# Patient Record
Sex: MALE | NOT HISPANIC OR LATINO
[De-identification: names, ages, dates, MRNs, and addresses within clinical notes are randomized per-mention and may not be internally consistent; named-entity substitution may affect disease eponyms.]

---

## 2021-05-05 ENCOUNTER — APPOINTMENT (OUTPATIENT)
Dept: UROLOGY | Facility: CLINIC | Age: 41
End: 2021-05-05
Payer: MEDICAID

## 2021-05-05 VITALS
WEIGHT: 154.32 LBS | BODY MASS INDEX: 24.22 KG/M2 | DIASTOLIC BLOOD PRESSURE: 90 MMHG | HEIGHT: 67 IN | TEMPERATURE: 98.5 F | HEART RATE: 85 BPM | SYSTOLIC BLOOD PRESSURE: 131 MMHG

## 2021-05-05 PROBLEM — Z00.00 ENCOUNTER FOR PREVENTIVE HEALTH EXAMINATION: Status: ACTIVE | Noted: 2021-05-05

## 2021-05-05 PROCEDURE — 99072 ADDL SUPL MATRL&STAF TM PHE: CPT

## 2021-05-05 PROCEDURE — 99204 OFFICE O/P NEW MOD 45 MIN: CPT

## 2021-05-05 RX ORDER — AMLODIPINE BESYLATE AND BENAZEPRIL HYDROCHLORIDE 10; 40 MG/1; MG/1
CAPSULE ORAL
Refills: 0 | Status: ACTIVE | COMMUNITY

## 2021-05-05 NOTE — ASSESSMENT
[FreeTextEntry1] : Very pleasant 40 yom who presents for evaluation of premature ejaculation.  \par \par - Discussed treatment options including numbing medications, SSRI's, and PDE-5 inhibitors.  \par - Will start paroxetine 10mg/day\par -Discussed risk of paroxetine\par - f/u in 1 month\par 
[FreeTextEntry1] : Very pleasant 40 yom who presents for evaluation of premature ejaculation.  \par \par - Discussed treatment options including numbing medications, SSRI's, and PDE-5 inhibitors.  \par - Will start paroxetine 10mg/day\par -Discussed risk of paroxetine\par - f/u in 1 month\par 
EOMI; PERRL; no drainage or redness

## 2021-05-05 NOTE — HISTORY OF PRESENT ILLNESS
[FreeTextEntry1] : Pleasant 40 yom with no PMH who presents for evaluation of premature ejaculation.  For the past year to year and a half he reports he frequently ejaculates quickly, typically within 1 to 2 minutes.  He said this intermittently occurred in the past, but has become more frequent than not.  Reports no issues with erections.  No issues with urination. Has tried lidocaine spray without success.  This is very bothersome to him.

## 2021-06-09 ENCOUNTER — APPOINTMENT (OUTPATIENT)
Dept: UROLOGY | Facility: CLINIC | Age: 41
End: 2021-06-09
Payer: MEDICAID

## 2021-06-09 PROCEDURE — 99214 OFFICE O/P EST MOD 30 MIN: CPT

## 2021-06-09 NOTE — PHYSICAL EXAM
[General Appearance - Well Developed] : well developed [General Appearance - Well Nourished] : well nourished [Normal Appearance] : normal appearance [Well Groomed] : well groomed [General Appearance - In No Acute Distress] : no acute distress [Abdomen Soft] : soft [Abdomen Tenderness] : non-tender [Costovertebral Angle Tenderness] : no ~M costovertebral angle tenderness [Urinary Bladder Findings] : the bladder was normal on palpation [Urethral Meatus] : meatus normal [Scrotum] : the scrotum was normal [Testes Mass (___cm)] : there were no testicular masses [Edema] : no peripheral edema [] : no respiratory distress [Respiration, Rhythm And Depth] : normal respiratory rhythm and effort [Exaggerated Use Of Accessory Muscles For Inspiration] : no accessory muscle use [Oriented To Time, Place, And Person] : oriented to person, place, and time [Affect] : the affect was normal [Mood] : the mood was normal [Not Anxious] : not anxious [Normal Station and Gait] : the gait and station were normal for the patient's age [No Focal Deficits] : no focal deficits [No Palpable Adenopathy] : no palpable adenopathy

## 2021-06-09 NOTE — ASSESSMENT
[FreeTextEntry1] : Very pleasant 40-year-old gentleman who presents for follow-up of premature ejaculation, new complaint of erectile dysfunction\par -Continue paroxetine\par -We had an extensive discussion regarding possible management options for erectile dysfunction, including PDE 5 inhibitors, ESSENCE, ICI, intraurethral alprostadil, penile prosthesis\par -After a thorough discussion of the risks and benefits of the aforementioned options he would like to try a trial of a PDE 5 inhibitor\par -Trial of Cialis\par -I discussed the risks, benefits, alternatives, and possible side effects of Cialis (tadalafil) therapy with the patient, including but not limited to headache, flushing, upset stomach, blurry vision, change in color vision, vision loss, and priapism with the patient.\par -Follow-up in 1 month

## 2021-06-09 NOTE — HISTORY OF PRESENT ILLNESS
[FreeTextEntry1] : Very pleasant 40 yom with no PMH who presents for follow-up of premature ejaculation.  For the past year to year and a half he reports he frequently ejaculates quickly, typically within 1 to 2 minutes.  No issues with urination. Has tried lidocaine spray without success.  This is very bothersome to him.\par \par He reports that paroxetine has significantly improved his intravaginal latency time.  He does report new onset of erectile dysfunction.  Previously he reports that his erections were relatively weak, however over the last month he reports 2 instances in which he was unable to obtain an erection at all.  He is interested in treatment for this.

## 2021-06-29 ENCOUNTER — RX CHANGE (OUTPATIENT)
Age: 41
End: 2021-06-29

## 2021-07-09 ENCOUNTER — APPOINTMENT (OUTPATIENT)
Dept: UROLOGY | Facility: CLINIC | Age: 41
End: 2021-07-09
Payer: MEDICAID

## 2021-07-09 PROCEDURE — 99214 OFFICE O/P EST MOD 30 MIN: CPT

## 2021-07-09 NOTE — HISTORY OF PRESENT ILLNESS
[FreeTextEntry1] : Very pleasant 40 yom with no PMH who presents for follow-up of premature ejaculation. He was started on paroxetine at last visit and reports that this has significantly improved his intravaginal latency time.  He also experiences ED and takes Cialis 5mg PRN prior to sexual activity. \par \par He states that his premature ejaculation has improved. His latency time is now anywhere from 6-8 minutes at times lasting up to 20 minutes. States that this is markedly improved from what he was experiencing prior to initiating medicine. Also states that he takes Cialis anywhere from 1-3x per week. Usually works for him but at times feels that the Cialis doesn't help and is interested in going up on his dose. No other complaints. Mood has been stable since starting SSRI.

## 2021-07-09 NOTE — ASSESSMENT
[FreeTextEntry1] : Very pleasant 40yoM presenting for f/u regarding premature ejaculation and ED. PE has improved significantly on paroxetine. Is taking Cialis but would like to go up on dose for ED\par --C/w paroxetine for premature ejaculation\par --Increase Cialis dose to 10mg PRN for sexual activity. Counseled patient on proper way and time to take medicine (30-60min prior to sexual activity, still needs sexual stimulation with medicine). \par -I discussed the risks, benefits, alternatives, and possible side effects of Cialis (tadalafil) therapy with the patient, including but not limited to headache, flushing, upset stomach, blurry vision, change in color vision, vision loss, and priapism with the patient.\par --F/u in 3 months

## 2021-07-13 ENCOUNTER — TRANSCRIPTION ENCOUNTER (OUTPATIENT)
Age: 41
End: 2021-07-13

## 2021-10-12 ENCOUNTER — APPOINTMENT (OUTPATIENT)
Dept: UROLOGY | Facility: CLINIC | Age: 41
End: 2021-10-12
Payer: MEDICAID

## 2021-10-12 PROCEDURE — 99214 OFFICE O/P EST MOD 30 MIN: CPT | Mod: 95

## 2021-10-12 RX ORDER — TADALAFIL 10 MG/1
10 TABLET, FILM COATED ORAL
Qty: 30 | Refills: 1 | Status: DISCONTINUED | COMMUNITY
Start: 2021-06-09 | End: 2021-10-12

## 2021-10-12 NOTE — ASSESSMENT
[FreeTextEntry1] : Very pleasant 41-year-old gentleman who presents for follow-up of erectile dysfunction and premature ejaculation\par -Stop Cialis\par -Trial of sildenafil 50 mg prior to sexual intercourse\par -I discussed the risks, benefits, alternatives, and possible side effects of Viagra (sildenafil) therapy with the patient, including but not limited to headache, flushing, upset stomach, blurry vision, change in color vision, vision loss, and priapism with the patient.\par -Continue paroxetine–refill sent to the pharmacy\par -Follow-up in 3 months

## 2021-10-12 NOTE — HISTORY OF PRESENT ILLNESS
[FreeTextEntry1] : Very pleasant 41 yom who presents for follow-up of premature ejaculation and erectile dysfunction. He was started on paroxetine previously and reports that this has significantly improved his intravaginal latency time.  He also reports that Cialis 10 mg has significantly improved his erections.  He does report GI upset, however and he is concerned that this may be due to Cialis.

## 2021-10-12 NOTE — PHYSICAL EXAM
[General Appearance - Well Developed] : well developed [General Appearance - Well Nourished] : well nourished [Normal Appearance] : normal appearance [Well Groomed] : well groomed [General Appearance - In No Acute Distress] : no acute distress [Abdomen Soft] : soft [Abdomen Tenderness] : non-tender [Costovertebral Angle Tenderness] : no ~M costovertebral angle tenderness [Urethral Meatus] : meatus normal [Urinary Bladder Findings] : the bladder was normal on palpation [Scrotum] : the scrotum was normal [Testes Mass (___cm)] : there were no testicular masses [] : no respiratory distress [Edema] : no peripheral edema [Exaggerated Use Of Accessory Muscles For Inspiration] : no accessory muscle use [Respiration, Rhythm And Depth] : normal respiratory rhythm and effort [Oriented To Time, Place, And Person] : oriented to person, place, and time [Affect] : the affect was normal [Mood] : the mood was normal [Not Anxious] : not anxious [Normal Station and Gait] : the gait and station were normal for the patient's age [No Focal Deficits] : no focal deficits [No Palpable Adenopathy] : no palpable adenopathy

## 2021-11-04 ENCOUNTER — RX RENEWAL (OUTPATIENT)
Age: 41
End: 2021-11-04

## 2021-11-08 ENCOUNTER — RX RENEWAL (OUTPATIENT)
Age: 41
End: 2021-11-08

## 2022-01-12 ENCOUNTER — LABORATORY RESULT (OUTPATIENT)
Age: 42
End: 2022-01-12

## 2022-01-12 ENCOUNTER — APPOINTMENT (OUTPATIENT)
Dept: UROLOGY | Facility: CLINIC | Age: 42
End: 2022-01-12
Payer: MEDICAID

## 2022-01-12 PROCEDURE — 99214 OFFICE O/P EST MOD 30 MIN: CPT

## 2022-01-12 RX ORDER — CLOTRIMAZOLE AND BETAMETHASONE DIPROPIONATE 10; .5 MG/G; MG/G
1-0.05 CREAM TOPICAL 3 TIMES DAILY
Qty: 1 | Refills: 0 | Status: ACTIVE | COMMUNITY
Start: 2022-01-12 | End: 1900-01-01

## 2022-01-12 NOTE — HISTORY OF PRESENT ILLNESS
[Currently Experiencing ___] :  [unfilled] [Erectile Dysfunction] : Erectile Dysfunction [FreeTextEntry1] : Mr. Cantu is a very pleasant 41 year old man here today with history of ED and premature ejaculation.  He also reports irritation under his foreskin recently which spontaneously resolved.  His wife, who accompanies him to the visit today, reports that she was diagnosed with type II genital herpes and they believe that this is the etiology of the lesions on his foreskin.\par He reports that since his last visit, potency of Viagra has slowly worn off.  He also reports that paroxetine 10 mg no longer significantly improves his intravaginal latency time.  He is interested in trying a stronger dose of the medication.\par He reports when he first started taking it it worked very well, now it occasionally works and occasionally doesn't work.\par Here today to go over options for treatment.\par

## 2022-01-12 NOTE — PHYSICAL EXAM
[General Appearance - Well Developed] : well developed [General Appearance - Well Nourished] : well nourished [Normal Appearance] : normal appearance [Well Groomed] : well groomed [General Appearance - In No Acute Distress] : no acute distress [Abdomen Soft] : soft [Abdomen Tenderness] : non-tender [Costovertebral Angle Tenderness] : no ~M costovertebral angle tenderness [Edema] : no peripheral edema [] : no respiratory distress [Respiration, Rhythm And Depth] : normal respiratory rhythm and effort [Exaggerated Use Of Accessory Muscles For Inspiration] : no accessory muscle use [Oriented To Time, Place, And Person] : oriented to person, place, and time [Affect] : the affect was normal [Mood] : the mood was normal [Not Anxious] : not anxious [Normal Station and Gait] : the gait and station were normal for the patient's age [No Focal Deficits] : no focal deficits [No Palpable Adenopathy] : no palpable adenopathy [FreeTextEntry1] : No evidence of balanitis or genital lesions at this time

## 2022-01-12 NOTE — ASSESSMENT
[FreeTextEntry1] : Mr. Cantu is a very pleasant 41 year old man here today with history of ED and premature ejaculation, new complaint of genital lesions concerning for type II herpes\par Continue Viagra 50 mg\par Increase dose of paroxetine to 20 mg.  We discussed the risk and benefits of this\par Send in clotrimazole betamethasone cream given concern for balanitis\par RPR, HIV and herpes testing today.\par Follow up in 3 months.\par

## 2022-01-13 LAB — HIV1+2 AB SPEC QL IA.RAPID: NONREACTIVE

## 2022-01-14 LAB
C PNEUM IGG SER QL: NORMAL TITER
C PNEUM IGM SER QL: NORMAL TITER
C PSITTACI IGG SER QL: NORMAL TITER
C PSITTACI IGM SER QL: NORMAL TITER
C TRACH B IGG SER QL: NORMAL TITER
C TRACH B IGM SER QL: NORMAL TITER
HSV 1+2 IGG SER IA-IMP: POSITIVE
HSV 1+2 IGG SER IA-IMP: POSITIVE
HSV1 IGG SER QL: 55.5 INDEX
HSV2 IGG SER QL: 17.5 INDEX

## 2022-01-18 LAB
HSV1 IGM SER QL: NEGATIVE
HSV2 AB FLD-ACNC: NEGATIVE
RPR SER-TITR: NORMAL
T PALLIDUM AB SER QL IA: POSITIVE

## 2022-01-18 RX ORDER — PENICILLIN G BENZATHINE 2400000 [IU]/4ML
2400000 INJECTION, SUSPENSION INTRAMUSCULAR
Qty: 1 | Refills: 0 | Status: ACTIVE | COMMUNITY
Start: 2022-01-18 | End: 1900-01-01

## 2022-01-19 ENCOUNTER — APPOINTMENT (OUTPATIENT)
Dept: UROLOGY | Facility: CLINIC | Age: 42
End: 2022-01-19

## 2022-01-19 ENCOUNTER — NON-APPOINTMENT (OUTPATIENT)
Age: 42
End: 2022-01-19

## 2022-01-20 ENCOUNTER — MED ADMIN CHARGE (OUTPATIENT)
Age: 42
End: 2022-01-20

## 2022-01-20 ENCOUNTER — APPOINTMENT (OUTPATIENT)
Dept: UROLOGY | Facility: CLINIC | Age: 42
End: 2022-01-20
Payer: MEDICAID

## 2022-01-20 VITALS — TEMPERATURE: 97 F

## 2022-01-20 PROCEDURE — 96372 THER/PROPH/DIAG INJ SC/IM: CPT

## 2022-01-20 RX ORDER — PENICILLIN G BENZATHINE 1200000 [IU]/2ML
1200000 INJECTION, SUSPENSION INTRAMUSCULAR
Qty: 1 | Refills: 0 | Status: COMPLETED | OUTPATIENT
Start: 2022-01-20

## 2022-01-20 RX ADMIN — PENICILLIN G BENZATHINE 0 UNIT/2ML: 1200000 INJECTION, SUSPENSION INTRAMUSCULAR at 00:00

## 2022-01-20 RX ADMIN — PENICILLIN G BENZATHINE AND PENICILLIN G PROCAINE 0 UNIT/2ML: 600000; 600000 INJECTION, SUSPENSION INTRAMUSCULAR at 00:00

## 2022-02-03 ENCOUNTER — APPOINTMENT (OUTPATIENT)
Dept: UROLOGY | Facility: CLINIC | Age: 42
End: 2022-02-03

## 2022-02-04 ENCOUNTER — LABORATORY RESULT (OUTPATIENT)
Age: 42
End: 2022-02-04

## 2022-02-04 ENCOUNTER — APPOINTMENT (OUTPATIENT)
Dept: UROLOGY | Facility: CLINIC | Age: 42
End: 2022-02-04
Payer: MEDICAID

## 2022-02-04 PROCEDURE — 99213 OFFICE O/P EST LOW 20 MIN: CPT

## 2022-02-04 RX ORDER — GUAIFENESIN 200 MG/10ML
200 SOLUTION ORAL
Qty: 300 | Refills: 0 | Status: ACTIVE | COMMUNITY
Start: 2021-12-12

## 2022-02-04 RX ORDER — AMLODIPINE BESYLATE 10 MG/1
10 TABLET ORAL
Qty: 90 | Refills: 0 | Status: ACTIVE | COMMUNITY
Start: 2021-12-26

## 2022-02-04 RX ORDER — CLOTRIMAZOLE 10 MG/ML
1 SOLUTION TOPICAL
Qty: 30 | Refills: 0 | Status: ACTIVE | COMMUNITY
Start: 2021-12-15

## 2022-02-04 RX ORDER — AMOXICILLIN 500 MG/1
500 CAPSULE ORAL
Qty: 21 | Refills: 0 | Status: ACTIVE | COMMUNITY
Start: 2021-11-26

## 2022-02-04 RX ORDER — IBUPROFEN 600 MG/1
600 TABLET, FILM COATED ORAL
Qty: 20 | Refills: 0 | Status: ACTIVE | COMMUNITY
Start: 2021-11-26

## 2022-02-04 RX ORDER — ACETAMINOPHEN AND CODEINE 300; 30 MG/1; MG/1
300-30 TABLET ORAL
Qty: 16 | Refills: 0 | Status: ACTIVE | COMMUNITY
Start: 2021-11-26

## 2022-02-04 NOTE — ASSESSMENT
[FreeTextEntry1] : Very pleasant 41-year-old gentleman who presents for follow-up after recent positive syphilis screen, balanitis, penile lesion\par -HIV negative\par -Herpes type I and II IgG positive\par -Herpes type I and II IgM negative\par -Repeat syphilis screen today\par -We discussed potential etiologies of a positive syphilis screen at length\par -We discussed safe sex practices; patient reports that he is only sexually active with his wife, however was sexually active with multiple female partners in the past prior to his relationship with his wife\par -Follow-up in 6 months if syphilis screening returns negative

## 2022-02-04 NOTE — HISTORY OF PRESENT ILLNESS
[FreeTextEntry1] : Very pleasant 41-year-old gentleman who presents for follow-up of positive syphilis test.  He underwent IM syphilis injection in the office approximately 2 weeks ago.  He reports no additional lesions on his penis.  He presents today for follow-up and to undergo syphilis screening again.

## 2022-02-07 ENCOUNTER — NON-APPOINTMENT (OUTPATIENT)
Age: 42
End: 2022-02-07

## 2022-02-07 LAB — T PALLIDUM AB SER QL IA: POSITIVE

## 2022-02-09 ENCOUNTER — LABORATORY RESULT (OUTPATIENT)
Age: 42
End: 2022-02-09

## 2022-02-09 ENCOUNTER — OUTPATIENT (OUTPATIENT)
Dept: OUTPATIENT SERVICES | Facility: HOSPITAL | Age: 42
LOS: 1 days | End: 2022-02-09
Payer: MEDICAID

## 2022-02-09 ENCOUNTER — RX RENEWAL (OUTPATIENT)
Age: 42
End: 2022-02-09

## 2022-02-09 ENCOUNTER — APPOINTMENT (OUTPATIENT)
Dept: INFECTIOUS DISEASE | Facility: CLINIC | Age: 42
End: 2022-02-09
Payer: MEDICAID

## 2022-02-09 VITALS
DIASTOLIC BLOOD PRESSURE: 92 MMHG | TEMPERATURE: 98.7 F | HEART RATE: 96 BPM | SYSTOLIC BLOOD PRESSURE: 142 MMHG | BODY MASS INDEX: 24.8 KG/M2 | OXYGEN SATURATION: 98 % | WEIGHT: 158 LBS | HEIGHT: 67 IN

## 2022-02-09 DIAGNOSIS — A53.9 SYPHILIS, UNSPECIFIED: ICD-10-CM

## 2022-02-09 DIAGNOSIS — F17.200 NICOTINE DEPENDENCE, UNSPECIFIED, UNCOMPLICATED: ICD-10-CM

## 2022-02-09 DIAGNOSIS — Z86.79 PERSONAL HISTORY OF OTHER DISEASES OF THE CIRCULATORY SYSTEM: ICD-10-CM

## 2022-02-09 DIAGNOSIS — B97.89 OTHER VIRAL AGENTS AS THE CAUSE OF DISEASES CLASSIFIED ELSEWHERE: ICD-10-CM

## 2022-02-09 DIAGNOSIS — Z80.9 FAMILY HISTORY OF MALIGNANT NEOPLASM, UNSPECIFIED: ICD-10-CM

## 2022-02-09 LAB
HBV CORE AB SER-ACNC: SIGNIFICANT CHANGE UP
HBV SURFACE AB SER-ACNC: ABNORMAL
HBV SURFACE AG SER-ACNC: SIGNIFICANT CHANGE UP
HCT VFR BLD CALC: 48.8 % — SIGNIFICANT CHANGE UP (ref 39–50)
HCV AB S/CO SERPL IA: 0.11 S/CO — SIGNIFICANT CHANGE UP (ref 0–0.99)
HCV AB SERPL-IMP: SIGNIFICANT CHANGE UP
HGB BLD-MCNC: 16 G/DL — SIGNIFICANT CHANGE UP (ref 13–17)
MCHC RBC-ENTMCNC: 28.4 PG — SIGNIFICANT CHANGE UP (ref 27–34)
MCHC RBC-ENTMCNC: 32.8 GM/DL — SIGNIFICANT CHANGE UP (ref 32–36)
MCV RBC AUTO: 86.5 FL — SIGNIFICANT CHANGE UP (ref 80–100)
PLATELET # BLD AUTO: 249 K/UL — SIGNIFICANT CHANGE UP (ref 150–400)
RBC # BLD: 5.64 M/UL — SIGNIFICANT CHANGE UP (ref 4.2–5.8)
RBC # FLD: 12.8 % — SIGNIFICANT CHANGE UP (ref 10.3–14.5)
WBC # BLD: 7.96 K/UL — SIGNIFICANT CHANGE UP (ref 3.8–10.5)
WBC # FLD AUTO: 7.96 K/UL — SIGNIFICANT CHANGE UP (ref 3.8–10.5)

## 2022-02-09 PROCEDURE — 80053 COMPREHEN METABOLIC PANEL: CPT

## 2022-02-09 PROCEDURE — 99203 OFFICE O/P NEW LOW 30 MIN: CPT | Mod: GC

## 2022-02-09 PROCEDURE — 96372 THER/PROPH/DIAG INJ SC/IM: CPT

## 2022-02-09 PROCEDURE — 87591 N.GONORRHOEAE DNA AMP PROB: CPT

## 2022-02-09 PROCEDURE — 86780 TREPONEMA PALLIDUM: CPT

## 2022-02-09 PROCEDURE — G0463: CPT | Mod: 25

## 2022-02-09 PROCEDURE — 86706 HEP B SURFACE ANTIBODY: CPT

## 2022-02-09 PROCEDURE — 85027 COMPLETE CBC AUTOMATED: CPT

## 2022-02-09 PROCEDURE — 87340 HEPATITIS B SURFACE AG IA: CPT

## 2022-02-09 PROCEDURE — 86803 HEPATITIS C AB TEST: CPT

## 2022-02-09 PROCEDURE — 86593 SYPHILIS TEST NON-TREP QUANT: CPT

## 2022-02-09 PROCEDURE — 86704 HEP B CORE ANTIBODY TOTAL: CPT

## 2022-02-09 PROCEDURE — 86592 SYPHILIS TEST NON-TREP QUAL: CPT

## 2022-02-09 PROCEDURE — 87491 CHLMYD TRACH DNA AMP PROBE: CPT

## 2022-02-09 RX ORDER — NICOTINE 21 MG/24HR
14 PATCH, TRANSDERMAL 24 HOURS TRANSDERMAL DAILY
Qty: 7 | Refills: 0 | Status: ACTIVE | COMMUNITY
Start: 2022-02-09 | End: 1900-01-01

## 2022-02-09 RX ORDER — PENICILLIN G BENZATHINE 2400000 [IU]/4ML
2400000 INJECTION, SUSPENSION INTRAMUSCULAR
Qty: 1 | Refills: 0 | Status: COMPLETED | OUTPATIENT
Start: 2022-02-09

## 2022-02-09 RX ORDER — VALACYCLOVIR 1 G/1
1 TABLET, FILM COATED ORAL
Qty: 14 | Refills: 1 | Status: DISCONTINUED | COMMUNITY
Start: 2022-01-14 | End: 2022-02-09

## 2022-02-09 RX ADMIN — PENICILLIN G BENZATHINE 4 UNIT/4ML: 2400000 INJECTION, SUSPENSION INTRAMUSCULAR at 00:00

## 2022-02-10 DIAGNOSIS — F17.200 NICOTINE DEPENDENCE, UNSPECIFIED, UNCOMPLICATED: ICD-10-CM

## 2022-02-10 DIAGNOSIS — A53.9 SYPHILIS, UNSPECIFIED: ICD-10-CM

## 2022-02-10 DIAGNOSIS — Z86.79 PERSONAL HISTORY OF OTHER DISEASES OF THE CIRCULATORY SYSTEM: ICD-10-CM

## 2022-02-10 DIAGNOSIS — Z80.9 FAMILY HISTORY OF MALIGNANT NEOPLASM, UNSPECIFIED: ICD-10-CM

## 2022-02-10 LAB
ALBUMIN SERPL ELPH-MCNC: 4.7 G/DL — SIGNIFICANT CHANGE UP (ref 3.3–5)
ALP SERPL-CCNC: 78 U/L — SIGNIFICANT CHANGE UP (ref 40–120)
ALT FLD-CCNC: 56 U/L — HIGH (ref 10–45)
ANION GAP SERPL CALC-SCNC: 14 MMOL/L — SIGNIFICANT CHANGE UP (ref 5–17)
AST SERPL-CCNC: 32 U/L — SIGNIFICANT CHANGE UP (ref 10–40)
BILIRUB SERPL-MCNC: 0.5 MG/DL — SIGNIFICANT CHANGE UP (ref 0.2–1.2)
BUN SERPL-MCNC: 16 MG/DL — SIGNIFICANT CHANGE UP (ref 7–23)
C TRACH RRNA SPEC QL NAA+PROBE: SIGNIFICANT CHANGE UP
C TRACH RRNA SPEC QL NAA+PROBE: SIGNIFICANT CHANGE UP
C TRACH+GC RRNA SPEC QL PROBE: SIGNIFICANT CHANGE UP
CALCIUM SERPL-MCNC: 9.7 MG/DL — SIGNIFICANT CHANGE UP (ref 8.4–10.5)
CHLAMYDIA/N. GONORRHEA, ORAL/THROAT, TMA - SOURCE ORAL: SIGNIFICANT CHANGE UP
CHLORIDE SERPL-SCNC: 104 MMOL/L — SIGNIFICANT CHANGE UP (ref 96–108)
CO2 SERPL-SCNC: 22 MMOL/L — SIGNIFICANT CHANGE UP (ref 22–31)
CREAT SERPL-MCNC: 0.91 MG/DL — SIGNIFICANT CHANGE UP (ref 0.5–1.3)
GLUCOSE SERPL-MCNC: 94 MG/DL — SIGNIFICANT CHANGE UP (ref 70–99)
N GONORRHOEA RRNA SPEC QL NAA+PROBE: SIGNIFICANT CHANGE UP
N GONORRHOEA RRNA SPEC QL NAA+PROBE: SIGNIFICANT CHANGE UP
POTASSIUM SERPL-MCNC: 4.3 MMOL/L — SIGNIFICANT CHANGE UP (ref 3.5–5.3)
POTASSIUM SERPL-SCNC: 4.3 MMOL/L — SIGNIFICANT CHANGE UP (ref 3.5–5.3)
PROT SERPL-MCNC: 7.6 G/DL — SIGNIFICANT CHANGE UP (ref 6–8.3)
RPR SER-TITR: (no result)
RPR SERPL-ACNC: REACTIVE
SODIUM SERPL-SCNC: 140 MMOL/L — SIGNIFICANT CHANGE UP (ref 135–145)
SPECIMEN SOURCE: SIGNIFICANT CHANGE UP
T PALLIDUM AB TITR SER: POSITIVE

## 2022-02-11 NOTE — END OF VISIT
[] : Fellow [FreeTextEntry3] : Patient seen and examined with Dr Corbin\par I agree with his history exam and palns as noted above. Patient needs complete STI screening testing, and HIV testing. Wife was rpesent at the visit and spoke with her separately \par Reports monogomy, \par will need treatment for latent syphilis\par Also with recent HSV outbreak in patient's wife- s/p treatment\par Patient will need screening along with Gc/chlamydia x2-3 sites\par \par RTc in 1 wweek for next PCN dose

## 2022-02-11 NOTE — ASSESSMENT
[FreeTextEntry1] : Mr. Cantu is a very pleasant 41 year old man with PMH of ED and premature ejaculation who has been referred to infectious disease clinic for treatment of syphillis. \par \par Recent workup\par Herpes type I and II IgG positive and IgM negative on 1/12/22\par RPR negative on 1/12 and 2/4. 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

## 2022-02-11 NOTE — HISTORY OF PRESENT ILLNESS
[Sexually Active] : The patient is sexually active [Monogamous] : monogamous [Female ___] : [unfilled] female [FreeTextEntry1] : Mr. Cantu is a very pleasant 41 year old man with PMH of Nephrolithisis, ED and premature ejaculation who has been evaluvated on Urology for the same. He recently reported self-resolved painless small lesion over the dorsum of his penis. His wife had reported that she was diagnosed with type II genital herpes. He was started on clotrimazole betamethasone cream given concern for balanitis and valacyclovir 1 gm BID x 7 days . He underwent STD testing as follows\par \par Herpes type I and II IgG positive and IgM negative \par RPR negative on 1/12 and 2/4. Treponemal pallidum Ab positive \par HIV negative on 1/13 \par \par He got treated with 2.4 million units of penicillin on Jan 20th 2022 and referred to infectious disease.\par \par Denies prior STDs. Apparently Wife tested and negative for syphillis, but tested positive for HSV ab. Denies anyother partners. Doesnt use condoms with wife. 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 [de-identified] : HSV positive [de-identified] : Wife

## 2022-02-16 ENCOUNTER — OUTPATIENT (OUTPATIENT)
Dept: OUTPATIENT SERVICES | Facility: HOSPITAL | Age: 42
LOS: 1 days | End: 2022-02-16
Payer: MEDICAID

## 2022-02-16 ENCOUNTER — APPOINTMENT (OUTPATIENT)
Dept: INFECTIOUS DISEASE | Facility: CLINIC | Age: 42
End: 2022-02-16

## 2022-02-16 DIAGNOSIS — Z86.79 PERSONAL HISTORY OF OTHER DISEASES OF THE CIRCULATORY SYSTEM: ICD-10-CM

## 2022-02-16 DIAGNOSIS — F17.200 NICOTINE DEPENDENCE, UNSPECIFIED, UNCOMPLICATED: ICD-10-CM

## 2022-02-16 DIAGNOSIS — Z80.9 FAMILY HISTORY OF MALIGNANT NEOPLASM, UNSPECIFIED: ICD-10-CM

## 2022-02-16 DIAGNOSIS — A53.9 SYPHILIS, UNSPECIFIED: ICD-10-CM

## 2022-02-16 PROCEDURE — 96372 THER/PROPH/DIAG INJ SC/IM: CPT

## 2022-02-16 RX ORDER — PENICILLIN G BENZATHINE 2400000 [IU]/4ML
2400000 INJECTION, SUSPENSION INTRAMUSCULAR
Qty: 1 | Refills: 0 | Status: COMPLETED | OUTPATIENT
Start: 2022-02-16

## 2022-02-16 RX ADMIN — PENICILLIN G BENZATHINE 4 UNIT/4ML: 2400000 INJECTION, SUSPENSION INTRAMUSCULAR at 00:00

## 2022-04-12 ENCOUNTER — APPOINTMENT (OUTPATIENT)
Dept: UROLOGY | Facility: CLINIC | Age: 42
End: 2022-04-12

## 2022-07-14 ENCOUNTER — TRANSCRIPTION ENCOUNTER (OUTPATIENT)
Age: 42
End: 2022-07-14

## 2022-08-05 ENCOUNTER — APPOINTMENT (OUTPATIENT)
Dept: UROLOGY | Facility: CLINIC | Age: 42
End: 2022-08-05

## 2022-08-05 VITALS
HEART RATE: 102 BPM | HEIGHT: 67 IN | TEMPERATURE: 97.4 F | DIASTOLIC BLOOD PRESSURE: 85 MMHG | BODY MASS INDEX: 25.74 KG/M2 | OXYGEN SATURATION: 98 % | SYSTOLIC BLOOD PRESSURE: 129 MMHG | WEIGHT: 164 LBS

## 2022-08-05 PROCEDURE — 99214 OFFICE O/P EST MOD 30 MIN: CPT

## 2022-08-05 NOTE — HISTORY OF PRESENT ILLNESS
[FreeTextEntry1] : Very pleasant 41-year-old gentleman presents for follow-up of premature ejaculation, erectile dysfunction, new finding of right kidney stones.  He reports that he recently went to [facility] with complaints of right flank pain.  A CT scan was performed which demonstrated large right kidney stones.  He went to the operating room for a ureteroscopy with laser lithotripsy.  A stent was placed for 15 days.  Thereafter he reports that he developed obstruction from a 4 mm fragment.  A review of records demonstrates an ultrasound at [facility] radiology from 7/22/2022 that demonstrated a 4 mm stone in the distal right ureter.  He reports that he feels better now.  He has not seen a stone pass.  He is scheduled to undergo a CT scan on Monday of next week.

## 2022-08-05 NOTE — PHYSICAL EXAM
[General Appearance - Well Developed] : well developed [General Appearance - Well Nourished] : well nourished [Normal Appearance] : normal appearance [Well Groomed] : well groomed [General Appearance - In No Acute Distress] : no acute distress [Abdomen Tenderness] : non-tender [Abdomen Soft] : soft [Costovertebral Angle Tenderness] : no ~M costovertebral angle tenderness [Edema] : no peripheral edema [] : no respiratory distress [Respiration, Rhythm And Depth] : normal respiratory rhythm and effort [Exaggerated Use Of Accessory Muscles For Inspiration] : no accessory muscle use [Oriented To Time, Place, And Person] : oriented to person, place, and time [Affect] : the affect was normal [Mood] : the mood was normal [Not Anxious] : not anxious [Normal Station and Gait] : the gait and station were normal for the patient's age [No Focal Deficits] : no focal deficits [No Palpable Adenopathy] : no palpable adenopathy

## 2022-08-05 NOTE — ASSESSMENT
[FreeTextEntry1] : Very pleasant 41-year-old gentleman who presents for follow-up of premature ejaculation, erectile dysfunction, new finding of right kidney stone, right ureteral stone\par -Ultrasound images reviewed demonstrating a 4 mm fragment in the distal right ureter\par -CT scan on Monday\par -Continue paroxetine for premature ejaculation\par -Continue sildenafil for erectile dysfunction\par -Follow-up on Tuesday\par -We discussed general stone prevention strategies, as well as options for treatment of a 4 mm distal ureteral stone\par -We will schedule ureteroscopy if CT scan continues to demonstrate a ureteral stone.

## 2022-08-08 LAB — BACTERIA UR CULT: NORMAL

## 2022-08-09 ENCOUNTER — APPOINTMENT (OUTPATIENT)
Dept: UROLOGY | Facility: CLINIC | Age: 42
End: 2022-08-09

## 2022-08-09 VITALS
TEMPERATURE: 97.7 F | DIASTOLIC BLOOD PRESSURE: 85 MMHG | WEIGHT: 164 LBS | HEIGHT: 67 IN | SYSTOLIC BLOOD PRESSURE: 139 MMHG | HEART RATE: 113 BPM | BODY MASS INDEX: 25.74 KG/M2

## 2022-08-09 PROCEDURE — 99214 OFFICE O/P EST MOD 30 MIN: CPT

## 2022-08-09 NOTE — ASSESSMENT
[FreeTextEntry1] : Very pleasant 42-year-old gentleman who presents for follow-up of right ureteral stones, right kidney stone\par -CT images from [facility] radiology reviewed demonstrating at least 4 right ureteral stones as well as a right lower pole intrarenal stone\par -Urine culture negative\par -I discussed the different treatment modalities for nephrolithiasis with the patient, including medical management, spontaneous stone passage, percutaneous stone extraction, extracorporeal shock wave lithotripsy, and ureteroscopy with laser lithotripsy and stone extraction. Given the size and location of the stone, I recommend and the patient opted to proceed with ureteroscopy.  The risks, benefits, and alternatives to ureteroscopy were discussed with the patient, including but not limited to pain, infection, bleeding, bladder injury, ureteral injury, renal injury, and treatment failure.  I also discussed the possible need for a temporary ureteral stent.  I discussed the possible side effects of a temporary ureteral stent, including flank pain, hematuria, and bladder spasms.  The patient understands that a stent is a temporary implant that must be removed in the future.  The patient wishes to proceed and we will schedule the surgery for the near future.

## 2022-08-09 NOTE — HISTORY OF PRESENT ILLNESS
[FreeTextEntry1] : Very pleasant 42-year-old gentleman who presents for follow-up of kidney stones and right ureteral stones.  He underwent a CT scan yesterday at [facility].  The results are not yet finalized, however on personal review of the images CT demonstrates number of right ureteral stones as well as a right intrarenal stone in the lower pole.  He denies dysuria.  He still reports intermittent flank pain.  No other complaints.

## 2022-08-12 ENCOUNTER — OUTPATIENT (OUTPATIENT)
Dept: OUTPATIENT SERVICES | Facility: HOSPITAL | Age: 42
LOS: 1 days | End: 2022-08-12
Payer: MEDICAID

## 2022-08-12 VITALS
SYSTOLIC BLOOD PRESSURE: 143 MMHG | RESPIRATION RATE: 16 BRPM | WEIGHT: 162.04 LBS | HEIGHT: 67 IN | DIASTOLIC BLOOD PRESSURE: 93 MMHG | OXYGEN SATURATION: 98 % | HEART RATE: 69 BPM | TEMPERATURE: 98 F

## 2022-08-12 DIAGNOSIS — N20.1 CALCULUS OF URETER: ICD-10-CM

## 2022-08-12 DIAGNOSIS — Z29.9 ENCOUNTER FOR PROPHYLACTIC MEASURES, UNSPECIFIED: ICD-10-CM

## 2022-08-12 DIAGNOSIS — I10 ESSENTIAL (PRIMARY) HYPERTENSION: ICD-10-CM

## 2022-08-12 DIAGNOSIS — Z87.81 PERSONAL HISTORY OF (HEALED) TRAUMATIC FRACTURE: Chronic | ICD-10-CM

## 2022-08-12 DIAGNOSIS — Z01.818 ENCOUNTER FOR OTHER PREPROCEDURAL EXAMINATION: ICD-10-CM

## 2022-08-12 DIAGNOSIS — N20.0 CALCULUS OF KIDNEY: Chronic | ICD-10-CM

## 2022-08-12 PROCEDURE — G0463: CPT

## 2022-08-12 RX ORDER — SODIUM CHLORIDE 9 MG/ML
3 INJECTION INTRAMUSCULAR; INTRAVENOUS; SUBCUTANEOUS EVERY 8 HOURS
Refills: 0 | Status: DISCONTINUED | OUTPATIENT
Start: 2022-08-18 | End: 2022-09-01

## 2022-08-12 NOTE — H&P PST ADULT - ASSESSMENT
41 yo male is scheduled for : Cystoscopy  Ureteroscopy  Lithotripsy of stone with Stent Insertion, on 8/18/22

## 2022-08-12 NOTE — H&P PST ADULT - HISTORY OF PRESENT ILLNESS
41 yo male with history of HTN, depression, reports the above.  Patient states had removed stones from the right kidney not to long ago, now is going to remove stone in the ureter.  He is scheduled for : Cystoscopy  Ureteroscopy  Lithotripsy of stone with Stent Insertion, on 8/18/22

## 2022-08-12 NOTE — H&P PST ADULT - PROBLEM SELECTOR PLAN 1
Cystoscopy  Ureteroscopy  Lithotripsy of stone with Stent Insertion      STOP BANG : 3  Intermediate risk for JOSE CRUZ complication

## 2022-08-12 NOTE — H&P PST ADULT - HEIGHT IN FEET
PT C/O scrotal swelling x 6 days. Denies pain, injury or trauma to area, denies penile discharge/bleeding. PHX: HTN, HLD, DM2, Prostate CA (states in remission with no active treatement).
5

## 2022-08-12 NOTE — H&P PST ADULT - NSICDXFAMILYHX_GEN_ALL_CORE_FT
FAMILY HISTORY:  Father  Still living? Yes, Estimated age: Age Unknown  Family history of kidney stone, Age at diagnosis: Age Unknown    Mother  Still living? Yes, Estimated age: Age Unknown  FH: type 2 diabetes, Age at diagnosis: Age Unknown

## 2022-08-15 LAB — NIDUS STONE QN: NORMAL

## 2022-08-17 ENCOUNTER — TRANSCRIPTION ENCOUNTER (OUTPATIENT)
Age: 42
End: 2022-08-17

## 2022-08-17 LAB — SARS-COV-2 N GENE NPH QL NAA+PROBE: NOT DETECTED

## 2022-08-18 ENCOUNTER — OUTPATIENT (OUTPATIENT)
Dept: OUTPATIENT SERVICES | Facility: HOSPITAL | Age: 42
LOS: 1 days | End: 2022-08-18
Payer: MEDICAID

## 2022-08-18 ENCOUNTER — TRANSCRIPTION ENCOUNTER (OUTPATIENT)
Age: 42
End: 2022-08-18

## 2022-08-18 ENCOUNTER — APPOINTMENT (OUTPATIENT)
Dept: UROLOGY | Facility: HOSPITAL | Age: 42
End: 2022-08-18

## 2022-08-18 VITALS
DIASTOLIC BLOOD PRESSURE: 83 MMHG | HEART RATE: 86 BPM | HEIGHT: 67 IN | WEIGHT: 162.04 LBS | RESPIRATION RATE: 16 BRPM | SYSTOLIC BLOOD PRESSURE: 125 MMHG | OXYGEN SATURATION: 98 % | TEMPERATURE: 98 F

## 2022-08-18 VITALS
OXYGEN SATURATION: 100 % | DIASTOLIC BLOOD PRESSURE: 81 MMHG | RESPIRATION RATE: 17 BRPM | SYSTOLIC BLOOD PRESSURE: 128 MMHG | HEART RATE: 68 BPM | TEMPERATURE: 98 F

## 2022-08-18 DIAGNOSIS — Z01.818 ENCOUNTER FOR OTHER PREPROCEDURAL EXAMINATION: ICD-10-CM

## 2022-08-18 DIAGNOSIS — N20.0 CALCULUS OF KIDNEY: Chronic | ICD-10-CM

## 2022-08-18 DIAGNOSIS — N20.1 CALCULUS OF URETER: ICD-10-CM

## 2022-08-18 DIAGNOSIS — Z87.81 PERSONAL HISTORY OF (HEALED) TRAUMATIC FRACTURE: Chronic | ICD-10-CM

## 2022-08-18 PROBLEM — Z86.59 PERSONAL HISTORY OF OTHER MENTAL AND BEHAVIORAL DISORDERS: Chronic | Status: ACTIVE | Noted: 2022-08-12

## 2022-08-18 PROBLEM — I10 ESSENTIAL (PRIMARY) HYPERTENSION: Chronic | Status: ACTIVE | Noted: 2022-08-12

## 2022-08-18 PROCEDURE — 52352 CYSTOURETERO W/STONE REMOVE: CPT | Mod: RT

## 2022-08-18 PROCEDURE — C2617: CPT

## 2022-08-18 PROCEDURE — 52332 CYSTOSCOPY AND TREATMENT: CPT | Mod: RT

## 2022-08-18 PROCEDURE — C1758: CPT

## 2022-08-18 PROCEDURE — C1889: CPT

## 2022-08-18 PROCEDURE — 74420 UROGRAPHY RTRGR +-KUB: CPT | Mod: 26

## 2022-08-18 PROCEDURE — C1769: CPT

## 2022-08-18 PROCEDURE — 76000 FLUOROSCOPY <1 HR PHYS/QHP: CPT

## 2022-08-18 DEVICE — URETERAL CATH OPEN END 5FR 70CM: Type: IMPLANTABLE DEVICE | Status: FUNCTIONAL

## 2022-08-18 DEVICE — URETERAL STENT PERCUFLEX PLUS 6FR 26CM: Type: IMPLANTABLE DEVICE | Status: FUNCTIONAL

## 2022-08-18 DEVICE — GUIDEWIRE SENSOR DUAL-FLEX NITINOL STRAIGHT .038" X 150CM: Type: IMPLANTABLE DEVICE | Status: FUNCTIONAL

## 2022-08-18 DEVICE — GUIDEWIRE AMPLATZ SUPER-STIFF .035" X 145CM 3.5CM FLEXIBLE: Type: IMPLANTABLE DEVICE | Status: FUNCTIONAL

## 2022-08-18 DEVICE — STONE BASKET ZEROTIP NITINOL 4-WIRE 1.9FR 120CM X 12MM: Type: IMPLANTABLE DEVICE | Status: FUNCTIONAL

## 2022-08-18 DEVICE — URETERAL CATH DUAL LUMEN 10FR 54CM: Type: IMPLANTABLE DEVICE | Status: FUNCTIONAL

## 2022-08-18 DEVICE — URETERAL SHEATH NAVIGATOR HD 12/14FR X 36CM: Type: IMPLANTABLE DEVICE | Status: FUNCTIONAL

## 2022-08-18 RX ORDER — CIPROFLOXACIN LACTATE 400MG/40ML
1 VIAL (ML) INTRAVENOUS
Qty: 6 | Refills: 0
Start: 2022-08-18 | End: 2022-08-20

## 2022-08-18 RX ORDER — FENTANYL CITRATE 50 UG/ML
25 INJECTION INTRAVENOUS
Refills: 0 | Status: DISCONTINUED | OUTPATIENT
Start: 2022-08-18 | End: 2022-08-18

## 2022-08-18 RX ORDER — ONDANSETRON 8 MG/1
4 TABLET, FILM COATED ORAL ONCE
Refills: 0 | Status: DISCONTINUED | OUTPATIENT
Start: 2022-08-18 | End: 2022-08-18

## 2022-08-18 RX ORDER — SODIUM CHLORIDE 9 MG/ML
1000 INJECTION, SOLUTION INTRAVENOUS
Refills: 0 | Status: DISCONTINUED | OUTPATIENT
Start: 2022-08-18 | End: 2022-08-18

## 2022-08-18 RX ORDER — ACETAMINOPHEN 500 MG
1000 TABLET ORAL ONCE
Refills: 0 | Status: COMPLETED | OUTPATIENT
Start: 2022-08-18 | End: 2022-08-18

## 2022-08-18 RX ADMIN — Medication 400 MILLIGRAM(S): at 11:55

## 2022-08-18 RX ADMIN — Medication 1000 MILLIGRAM(S): at 12:01

## 2022-08-18 NOTE — ASU DISCHARGE PLAN (ADULT/PEDIATRIC) - CARE PROVIDER_API CALL
Abdi Cuadra)  Urology  [address]  [place] [address]  Phone: [phone]  Fax: [phone]  Follow Up Time: 1 week

## 2022-08-18 NOTE — BRIEF OPERATIVE NOTE - NSICDXBRIEFPROCEDURE_GEN_ALL_CORE_FT
PROCEDURES:  Cystourethroscopy, with ureteroscopy 18-Aug-2022 11:34:49 stone extraction, stent placement Alma Gonzalez

## 2022-08-18 NOTE — ASU DISCHARGE PLAN (ADULT/PEDIATRIC) - ASU DC SPECIAL INSTRUCTIONSFT
STENT: You may have an internal stent (a hollow tube that runs from the kidney to your bladder) after your procedure, which helps urine drain from the kidney to your bladder. Some patients experience urinary frequency, burning, or even back pain (especially with urination). These sensations will gradually get better. Increasing your fluid intake can also improve these symptoms. While the stent is in place, your urine may continue to be bloody. This stent is temporary and must be removed by your urologist as an outpatient with in 3 months unless otherwise specified.   GENERAL: It is common to have blood in your urine after your procedure. It may be pink or even red; inform your doctor if you have a significant amount of clot in the urine or if you are unable to void at all. The urine may clear and then become bloody again especially as you are more physically active.  BATHING: You may shower or bathe.  DIET: You may resume your regular diet and regular medication regimen.  PAIN: You may take Tylenol (acetaminophen) 650-975mg and/or Motrin (ibuprofen) 400-600mg, both available over the counter, for pain every 6 hours as needed. Do not exceed 4000mg of Tylenol (acetaminophen) daily. You may alternate these medications such that you take one or the other every 3 hours for around the clock pain coverage.  ANTIBIOTICS: You may be given a prescription for an antibiotic, please take this medication as instructed and be sure to complete the entire course.  STOOL SOFTENERS: Do not allow yourself to become constipated as straining may cause bleeding. Take stool softeners or a laxative (ex. Miralax, Colace, Senokot, ExLax, etc), available over the counter, if needed.  ACTIVITY: No heavy lifting or strenuous exercise until you are evaluated at your post-operative appointment. Otherwise, you may return to your usual level of physical activity.  FOLLOW-UP: If you did not already schedule your post-operative appointment, please call your urologist to schedule a follow-up appointment.  CALL YOUR UROLOGIST IF: You have any bleeding that does not stop, inability to void >8 hours, fever over 100.4 F, chills, persistent nausea/vomiting, changes in your incision concerning for infection, or if your pain is not controlled on your discharge pain medications.

## 2022-08-18 NOTE — ASU PATIENT PROFILE, ADULT - FALL HARM RISK - UNIVERSAL INTERVENTIONS
Bed in lowest position, wheels locked, appropriate side rails in place/Call bell, personal items and telephone in reach/Instruct patient to call for assistance before getting out of bed or chair/Non-slip footwear when patient is out of bed/[place] to call system/Physically safe environment - no spills, clutter or unnecessary equipment/Purposeful Proactive Rounding/Room/bathroom lighting operational, light cord in reach

## 2022-08-26 ENCOUNTER — APPOINTMENT (OUTPATIENT)
Dept: UROLOGY | Facility: CLINIC | Age: 42
End: 2022-08-26

## 2022-08-26 VITALS
WEIGHT: 165 LBS | BODY MASS INDEX: 25.9 KG/M2 | SYSTOLIC BLOOD PRESSURE: 128 MMHG | HEART RATE: 97 BPM | HEIGHT: 67 IN | TEMPERATURE: 98 F | DIASTOLIC BLOOD PRESSURE: 89 MMHG | OXYGEN SATURATION: 98 %

## 2022-08-26 PROCEDURE — 52310 CYSTOSCOPY AND TREATMENT: CPT

## 2022-09-13 ENCOUNTER — APPOINTMENT (OUTPATIENT)
Dept: UROLOGY | Facility: CLINIC | Age: 42
End: 2022-09-13

## 2022-10-07 ENCOUNTER — APPOINTMENT (OUTPATIENT)
Dept: UROLOGY | Facility: CLINIC | Age: 42
End: 2022-10-07

## 2022-10-07 PROCEDURE — 76775 US EXAM ABDO BACK WALL LIM: CPT

## 2022-10-07 PROCEDURE — 99214 OFFICE O/P EST MOD 30 MIN: CPT

## 2022-10-07 NOTE — ASSESSMENT
[FreeTextEntry1] : Very pleasant 42-year-old gentleman who presents for follow-up of kidney stones, premature ejaculation, erectile dysfunction\par -Renal ultrasound images today reviewed demonstrating mild right hydronephrosis versus pelvic fullness, significantly improved from prior imaging but does not demonstrate any kidney stones, left hydronephrosis, or renal masses\par -Calculus analysis demonstrates 90% calcium oxalate monohydrate, 10% calcium phosphate (apatite) stone composition\par -LithoLink urinalysis\par -CMP\par -PTH\par -TSH\par -Uric acid\par -Follow-up in 1 month

## 2022-10-07 NOTE — HISTORY OF PRESENT ILLNESS
[FreeTextEntry1] : Very pleasant 42-year-old gentleman who presents for follow-up of kidney stones and right ureteral stones, premature ejaculation, erectile dysfunction.  He recently underwent a ureteroscopy with stone extraction for large right ureteral and kidney stones status post ESWL with another urologist.  He reports resolution of pain since removing the right ureteral stent.  He denies dysuria.  No hematuria.  No flank pain or suprapubic pain.  Calculus analysis demonstrates 90% calcium oxalate monohydrate, 10% calcium phosphate (apatite) stone composition.

## 2022-10-10 LAB
ALBUMIN SERPL ELPH-MCNC: 5.1 G/DL
ALP BLD-CCNC: 94 U/L
ALT SERPL-CCNC: 72 U/L
ANION GAP SERPL CALC-SCNC: 18 MMOL/L
AST SERPL-CCNC: 40 U/L
BILIRUB SERPL-MCNC: 0.4 MG/DL
BUN SERPL-MCNC: 15 MG/DL
CALCIUM SERPL-MCNC: 10.1 MG/DL
CALCIUM SERPL-MCNC: 10.1 MG/DL
CHLORIDE SERPL-SCNC: 104 MMOL/L
CO2 SERPL-SCNC: 21 MMOL/L
CREAT SERPL-MCNC: 0.93 MG/DL
EGFR: 104 ML/MIN/1.73M2
ESTIMATED AVERAGE GLUCOSE: 108 MG/DL
GLUCOSE SERPL-MCNC: 93 MG/DL
HBA1C MFR BLD HPLC: 5.4 %
PARATHYROID HORMONE INTACT: 34 PG/ML
POTASSIUM SERPL-SCNC: 4.6 MMOL/L
PROT SERPL-MCNC: 8 G/DL
SODIUM SERPL-SCNC: 144 MMOL/L
TSH SERPL-ACNC: 1.52 UIU/ML
URATE SERPL-MCNC: 3.8 MG/DL

## 2022-11-03 ENCOUNTER — TRANSCRIPTION ENCOUNTER (OUTPATIENT)
Age: 42
End: 2022-11-03

## 2022-11-29 ENCOUNTER — APPOINTMENT (OUTPATIENT)
Dept: UROLOGY | Facility: CLINIC | Age: 42
End: 2022-11-29

## 2022-11-29 VITALS
TEMPERATURE: 98.5 F | HEART RATE: 90 BPM | OXYGEN SATURATION: 99 % | SYSTOLIC BLOOD PRESSURE: 130 MMHG | WEIGHT: 169 LBS | BODY MASS INDEX: 26.53 KG/M2 | DIASTOLIC BLOOD PRESSURE: 80 MMHG | HEIGHT: 67 IN

## 2022-11-29 PROCEDURE — 99214 OFFICE O/P EST MOD 30 MIN: CPT

## 2022-11-29 RX ORDER — SILDENAFIL 50 MG/1
50 TABLET ORAL
Qty: 90 | Refills: 0 | Status: ACTIVE | COMMUNITY
Start: 2021-10-12 | End: 1900-01-01

## 2022-11-29 NOTE — HISTORY OF PRESENT ILLNESS
[FreeTextEntry1] : Very pleasant 42-year-old gentleman who presents for follow-up of kidney stones and right ureteral stones, premature ejaculation, erectile dysfunction.  He underwent a ureteroscopy with stone extraction for large right ureteral and kidney stones in August 2022, status post ESWL with another urologist.  He reports resolution of pain since removing the right ureteral stent.  He denies dysuria.  No hematuria.  No flank pain or suprapubic pain.  Calculus analysis demonstrates 90% calcium oxalate monohydrate, 10% calcium phosphate (apatite) stone composition.\par \par He now feels well.  He presents today for work-up for stones.

## 2022-11-29 NOTE — ASSESSMENT
[FreeTextEntry1] : Very pleasant 42-year-old gentleman presents for follow-up of kidney stones, premature ejaculation, erectile dysfunction\par -TSH 1.52\par -PTH 34, calcium 10.1\par -A1c 5.4%\par -Creatinine 0.93\par -Uric acid 3.8\par -Litholink urinalysis demonstrates suboptimal urine volume, low urine citrate, hypomagnesia urea, slightly elevated supersaturation calcium oxalate and supersaturation calcium phosphate\par -We discussed general stone prevention strategies.  We discussed the importance of drinking more water\par -We discussed options to increase urine citrate, including dietary factors as well as medications and at this time he wishes to try diet modification first\par -Follow-up in 1 month with renal ultrasound for stone surveillance\par -continue paroxteine for PED\par -continue viagra for ED

## 2022-12-05 ENCOUNTER — RX CHANGE (OUTPATIENT)
Age: 42
End: 2022-12-05

## 2022-12-05 RX ORDER — PAROXETINE HYDROCHLORIDE 20 MG/1
20 TABLET, FILM COATED ORAL DAILY
Qty: 30 | Refills: 1 | Status: DISCONTINUED | COMMUNITY
Start: 2022-01-12 | End: 2022-12-05

## 2023-01-03 ENCOUNTER — APPOINTMENT (OUTPATIENT)
Dept: UROLOGY | Facility: CLINIC | Age: 43
End: 2023-01-03
Payer: MEDICAID

## 2023-01-03 PROCEDURE — 99214 OFFICE O/P EST MOD 30 MIN: CPT

## 2023-01-03 PROCEDURE — 76775 US EXAM ABDO BACK WALL LIM: CPT

## 2023-01-03 NOTE — ASSESSMENT
[FreeTextEntry1] : Very pleasant 42-year-old gentleman presents for follow-up of premature ejaculation, erectile dysfunction, right kidney stones in the past\par -Ultrasound images reviewed demonstrating no kidney stones, left hydronephrosis, or renal masses but it does demonstrate mild right hydronephrosis versus renal pelvic fullness\par -We discussed the option of performing a CT scan at this time, however given that he is not having pain we discussed the chronicity of this and he would like to forego a CT scan at this time\par -Follow-up in 6 months with renal ultrasound stone surveillance\par -Continue sildenafil\par -Continue paroxetine

## 2023-01-03 NOTE — HISTORY OF PRESENT ILLNESS
[FreeTextEntry1] : Very pleasant 42-year-old gentleman who presents for follow-up of kidney stones and right ureteral stones, premature ejaculation, erectile dysfunction.  He underwent a ureteroscopy with stone extraction for large right ureteral and kidney stones in August 2022, status post ESWL with another urologist.  He reports resolution of pain since removing the right ureteral stent.  He denies dysuria.  No hematuria.  No flank pain or suprapubic pain.  Calculus analysis demonstrates 90% calcium oxalate monohydrate, 10% calcium phosphate (apatite) stone composition.\par \par He continues to report that premature ejaculation and erectile dysfunction are significantly improved.  He underwent a renal ultrasound today which demonstrated no kidney stones, left hydronephrosis, nor renal masses but it did demonstrate mild right hydronephrosis versus renal pelvic fullness.

## 2023-07-05 ENCOUNTER — APPOINTMENT (OUTPATIENT)
Dept: UROLOGY | Facility: CLINIC | Age: 43
End: 2023-07-05
Payer: COMMERCIAL

## 2023-07-05 PROCEDURE — 99214 OFFICE O/P EST MOD 30 MIN: CPT

## 2023-07-05 PROCEDURE — 76775 US EXAM ABDO BACK WALL LIM: CPT

## 2023-07-05 NOTE — ASSESSMENT
[FreeTextEntry1] : Very pleasant 42-year-old gentleman who presents for follow-up of erectile dysfunction, premature ejaculation, history of kidney stones, history of right hydronephrosis\par -Ultrasound images reviewed with the patient demonstrating resolution of right hydronephrosis and no kidney stones bilaterally\par -Continue paroxetine for erectile dysfunction–refill sent to the pharmacy\par -Continue sildenafil for erectile dysfunction–refill sent to the pharmacy\par -Repeat ultrasound in 6 months and follow-up at that time

## 2023-07-05 NOTE — HISTORY OF PRESENT ILLNESS
[FreeTextEntry1] : Very pleasant 42-year-old gentleman who presents for follow-up of erectile dysfunction, premature ejaculation, history of kidney stones, history of right hydronephrosis.  He currently feels well.  He underwent a renal ultrasound today which demonstrates resolution of right hydronephrosis and no kidney stones.  He denies flank pain or suprapubic pain.  He reports that paroxetine continues to improve his intravaginal latency time.  He also reports that sildenafil continues to improve his erections

## 2023-07-07 RX ORDER — PAROXETINE HYDROCHLORIDE 10 MG/1
10 TABLET, FILM COATED ORAL
Qty: 90 | Refills: 2 | Status: ACTIVE | COMMUNITY
Start: 2021-05-05 | End: 1900-01-01

## 2023-07-11 ENCOUNTER — RX RENEWAL (OUTPATIENT)
Age: 43
End: 2023-07-11

## 2023-10-09 ENCOUNTER — TRANSCRIPTION ENCOUNTER (OUTPATIENT)
Age: 43
End: 2023-10-09

## 2023-10-10 ENCOUNTER — TRANSCRIPTION ENCOUNTER (OUTPATIENT)
Age: 43
End: 2023-10-10

## 2023-10-11 ENCOUNTER — APPOINTMENT (OUTPATIENT)
Dept: UROLOGY | Facility: CLINIC | Age: 43
End: 2023-10-11
Payer: COMMERCIAL

## 2023-10-11 VITALS
WEIGHT: 167 LBS | BODY MASS INDEX: 26.21 KG/M2 | HEIGHT: 67 IN | OXYGEN SATURATION: 98 % | SYSTOLIC BLOOD PRESSURE: 140 MMHG | DIASTOLIC BLOOD PRESSURE: 95 MMHG | HEART RATE: 102 BPM

## 2023-10-11 VITALS — TEMPERATURE: 98.1 F

## 2023-10-11 PROCEDURE — 99214 OFFICE O/P EST MOD 30 MIN: CPT

## 2023-10-11 RX ORDER — VALACYCLOVIR 1 G/1
1 TABLET, FILM COATED ORAL
Qty: 20 | Refills: 0 | Status: ACTIVE | COMMUNITY
Start: 2023-10-11 | End: 1900-01-01

## 2023-10-25 ENCOUNTER — APPOINTMENT (OUTPATIENT)
Dept: UROLOGY | Facility: CLINIC | Age: 43
End: 2023-10-25

## 2023-12-08 ENCOUNTER — RX RENEWAL (OUTPATIENT)
Age: 43
End: 2023-12-08

## 2023-12-08 RX ORDER — PAROXETINE HYDROCHLORIDE 20 MG/1
20 TABLET, FILM COATED ORAL
Qty: 90 | Refills: 1 | Status: ACTIVE | COMMUNITY
Start: 2022-12-05 | End: 1900-01-01

## 2024-01-05 ENCOUNTER — APPOINTMENT (OUTPATIENT)
Dept: UROLOGY | Facility: CLINIC | Age: 44
End: 2024-01-05

## 2024-01-05 ENCOUNTER — APPOINTMENT (OUTPATIENT)
Dept: UROLOGY | Facility: CLINIC | Age: 44
End: 2024-01-05
Payer: COMMERCIAL

## 2024-01-05 ENCOUNTER — NON-APPOINTMENT (OUTPATIENT)
Age: 44
End: 2024-01-05

## 2024-01-05 VITALS
SYSTOLIC BLOOD PRESSURE: 137 MMHG | TEMPERATURE: 98.1 F | OXYGEN SATURATION: 99 % | HEART RATE: 97 BPM | WEIGHT: 170 LBS | HEIGHT: 67 IN | BODY MASS INDEX: 26.68 KG/M2 | DIASTOLIC BLOOD PRESSURE: 92 MMHG

## 2024-01-05 DIAGNOSIS — A60.02 HERPESVIRAL INFECTION OF OTHER MALE GENITAL ORGANS: ICD-10-CM

## 2024-01-05 PROCEDURE — 99214 OFFICE O/P EST MOD 30 MIN: CPT | Mod: 25

## 2024-01-05 PROCEDURE — 76775 US EXAM ABDO BACK WALL LIM: CPT

## 2024-01-05 NOTE — ASSESSMENT
[FreeTextEntry1] : Very pleasant 43-year-old gentleman who presents for follow-up of erectile dysfunction, history of genital herpes, balanitis, kidney stones -Ultrasound images reviewed today demonstrating no kidney stones, hydronephrosis, renal masses -We discussed additional options for management of erectile dysfunction.  At this time he wishes to continue to take Viagra as needed -Continue clotrimazole-betamethasone cream sparingly and only as needed -Follow-up in 6 months with repeat renal ultrasound

## 2024-03-05 ENCOUNTER — EMERGENCY (EMERGENCY)
Facility: HOSPITAL | Age: 44
LOS: 1 days | Discharge: HOME | End: 2024-03-05
Attending: EMERGENCY MEDICINE
Payer: COMMERCIAL

## 2024-03-05 VITALS
WEIGHT: 167.55 LBS | DIASTOLIC BLOOD PRESSURE: 112 MMHG | OXYGEN SATURATION: 97 % | HEART RATE: 105 BPM | SYSTOLIC BLOOD PRESSURE: 154 MMHG | RESPIRATION RATE: 18 BRPM | TEMPERATURE: 99 F

## 2024-03-05 DIAGNOSIS — N20.0 CALCULUS OF KIDNEY: Chronic | ICD-10-CM

## 2024-03-05 DIAGNOSIS — Z87.81 PERSONAL HISTORY OF (HEALED) TRAUMATIC FRACTURE: Chronic | ICD-10-CM

## 2024-03-05 LAB
ALBUMIN SERPL ELPH-MCNC: 4 G/DL — SIGNIFICANT CHANGE UP (ref 3.5–5)
ALP SERPL-CCNC: 86 U/L — SIGNIFICANT CHANGE UP (ref 40–120)
ALT FLD-CCNC: 86 U/L DA — HIGH (ref 10–60)
ANION GAP SERPL CALC-SCNC: 4 MMOL/L — LOW (ref 5–17)
AST SERPL-CCNC: 46 U/L — HIGH (ref 10–40)
BASOPHILS # BLD AUTO: 0.04 K/UL — SIGNIFICANT CHANGE UP (ref 0–0.2)
BASOPHILS NFR BLD AUTO: 0.5 % — SIGNIFICANT CHANGE UP (ref 0–2)
BILIRUB SERPL-MCNC: 0.6 MG/DL — SIGNIFICANT CHANGE UP (ref 0.2–1.2)
BUN SERPL-MCNC: 15 MG/DL — SIGNIFICANT CHANGE UP (ref 7–18)
CALCIUM SERPL-MCNC: 8.9 MG/DL — SIGNIFICANT CHANGE UP (ref 8.4–10.5)
CHLORIDE SERPL-SCNC: 107 MMOL/L — SIGNIFICANT CHANGE UP (ref 96–108)
CO2 SERPL-SCNC: 25 MMOL/L — SIGNIFICANT CHANGE UP (ref 22–31)
CREAT SERPL-MCNC: 0.89 MG/DL — SIGNIFICANT CHANGE UP (ref 0.5–1.3)
EGFR: 108 ML/MIN/1.73M2 — SIGNIFICANT CHANGE UP
EOSINOPHIL # BLD AUTO: 0.22 K/UL — SIGNIFICANT CHANGE UP (ref 0–0.5)
EOSINOPHIL NFR BLD AUTO: 2.6 % — SIGNIFICANT CHANGE UP (ref 0–6)
GLUCOSE SERPL-MCNC: 84 MG/DL — SIGNIFICANT CHANGE UP (ref 70–99)
HCT VFR BLD CALC: 48.5 % — SIGNIFICANT CHANGE UP (ref 39–50)
HGB BLD-MCNC: 15.8 G/DL — SIGNIFICANT CHANGE UP (ref 13–17)
IMM GRANULOCYTES NFR BLD AUTO: 0.2 % — SIGNIFICANT CHANGE UP (ref 0–0.9)
LIDOCAIN IGE QN: 31 U/L — SIGNIFICANT CHANGE UP (ref 13–75)
LYMPHOCYTES # BLD AUTO: 2.36 K/UL — SIGNIFICANT CHANGE UP (ref 1–3.3)
LYMPHOCYTES # BLD AUTO: 28.3 % — SIGNIFICANT CHANGE UP (ref 13–44)
MAGNESIUM SERPL-MCNC: 2.4 MG/DL — SIGNIFICANT CHANGE UP (ref 1.6–2.6)
MCHC RBC-ENTMCNC: 28.4 PG — SIGNIFICANT CHANGE UP (ref 27–34)
MCHC RBC-ENTMCNC: 32.6 GM/DL — SIGNIFICANT CHANGE UP (ref 32–36)
MCV RBC AUTO: 87.1 FL — SIGNIFICANT CHANGE UP (ref 80–100)
MONOCYTES # BLD AUTO: 1.42 K/UL — HIGH (ref 0–0.9)
MONOCYTES NFR BLD AUTO: 17 % — HIGH (ref 2–14)
NEUTROPHILS # BLD AUTO: 4.29 K/UL — SIGNIFICANT CHANGE UP (ref 1.8–7.4)
NEUTROPHILS NFR BLD AUTO: 51.4 % — SIGNIFICANT CHANGE UP (ref 43–77)
NRBC # BLD: 0 /100 WBCS — SIGNIFICANT CHANGE UP (ref 0–0)
PLATELET # BLD AUTO: 235 K/UL — SIGNIFICANT CHANGE UP (ref 150–400)
POTASSIUM SERPL-MCNC: 3.7 MMOL/L — SIGNIFICANT CHANGE UP (ref 3.5–5.3)
POTASSIUM SERPL-SCNC: 3.7 MMOL/L — SIGNIFICANT CHANGE UP (ref 3.5–5.3)
PROT SERPL-MCNC: 7.6 G/DL — SIGNIFICANT CHANGE UP (ref 6–8.3)
RBC # BLD: 5.57 M/UL — SIGNIFICANT CHANGE UP (ref 4.2–5.8)
RBC # FLD: 12.5 % — SIGNIFICANT CHANGE UP (ref 10.3–14.5)
SODIUM SERPL-SCNC: 136 MMOL/L — SIGNIFICANT CHANGE UP (ref 135–145)
WBC # BLD: 8.35 K/UL — SIGNIFICANT CHANGE UP (ref 3.8–10.5)
WBC # FLD AUTO: 8.35 K/UL — SIGNIFICANT CHANGE UP (ref 3.8–10.5)

## 2024-03-05 PROCEDURE — 74176 CT ABD & PELVIS W/O CONTRAST: CPT | Mod: 26,MC

## 2024-03-05 PROCEDURE — 99285 EMERGENCY DEPT VISIT HI MDM: CPT

## 2024-03-05 RX ORDER — SODIUM CHLORIDE 9 MG/ML
1000 INJECTION INTRAMUSCULAR; INTRAVENOUS; SUBCUTANEOUS ONCE
Refills: 0 | Status: COMPLETED | OUTPATIENT
Start: 2024-03-05 | End: 2024-03-05

## 2024-03-05 RX ORDER — MORPHINE SULFATE 50 MG/1
4 CAPSULE, EXTENDED RELEASE ORAL ONCE
Refills: 0 | Status: DISCONTINUED | OUTPATIENT
Start: 2024-03-05 | End: 2024-03-05

## 2024-03-05 RX ORDER — KETOROLAC TROMETHAMINE 30 MG/ML
30 SYRINGE (ML) INJECTION ONCE
Refills: 0 | Status: DISCONTINUED | OUTPATIENT
Start: 2024-03-05 | End: 2024-03-05

## 2024-03-05 RX ORDER — TAMSULOSIN HYDROCHLORIDE 0.4 MG/1
0.4 CAPSULE ORAL ONCE
Refills: 0 | Status: COMPLETED | OUTPATIENT
Start: 2024-03-05 | End: 2024-03-05

## 2024-03-05 RX ADMIN — Medication 30 MILLIGRAM(S): at 22:32

## 2024-03-05 RX ADMIN — Medication 30 MILLIGRAM(S): at 23:35

## 2024-03-05 RX ADMIN — MORPHINE SULFATE 4 MILLIGRAM(S): 50 CAPSULE, EXTENDED RELEASE ORAL at 23:35

## 2024-03-05 RX ADMIN — SODIUM CHLORIDE 1000 MILLILITER(S): 9 INJECTION INTRAMUSCULAR; INTRAVENOUS; SUBCUTANEOUS at 23:35

## 2024-03-05 RX ADMIN — SODIUM CHLORIDE 1000 MILLILITER(S): 9 INJECTION INTRAMUSCULAR; INTRAVENOUS; SUBCUTANEOUS at 22:31

## 2024-03-05 RX ADMIN — TAMSULOSIN HYDROCHLORIDE 0.4 MILLIGRAM(S): 0.4 CAPSULE ORAL at 23:32

## 2024-03-05 RX ADMIN — MORPHINE SULFATE 4 MILLIGRAM(S): 50 CAPSULE, EXTENDED RELEASE ORAL at 22:31

## 2024-03-05 NOTE — ED PROVIDER NOTE - PATIENT PORTAL LINK FT
You can access the FollowMyHealth Patient Portal offered by [facility] by registering at the following website: [email or web address]. By joining [employer]’s FollowMyHealth portal, you will also be able to view your health information using other applications (apps) compatible with our system.

## 2024-03-05 NOTE — ED PROVIDER NOTE - ED STEMI HIDDEN
Medical Necessity Clause: This procedure was medically necessary because the lesions that were treated were:
Render Post-Care Instructions In Note?: yes
Render Note In Bullet Format When Appropriate: No
Detail Level: Detailed
Medical Necessity Information: It is in your best interest to select a reason for this procedure from the list below. All of these items fulfill various CMS LCD requirements except the new and changing color options.
Consent: The patient's consent was obtained including but not limited to risks of crusting, scabbing, blistering, scarring, darker or lighter pigmentary change, recurrence, incomplete removal and infection.
Post-Care Instructions: I reviewed with the patient in detail post-care instructions. Patient is to wear sunprotection, and avoid picking at any of the treated lesions. Pt may apply Vaseline to crusted or scabbing areas.
hide

## 2024-03-05 NOTE — ED PROVIDER NOTE - OBJECTIVE STATEMENT
43 year old male PMH kidney stones coming in with left flank pain radiating to left abdomen with associated nausea. states feels similar to prior kidney stones. pt states that for the past 3 days with mild intermittent pain but today pain became much more severe. denies all other complaints. states last kidney stone had a uretal stent placed by dr mcguire.

## 2024-03-05 NOTE — ED PROVIDER NOTE - CARE PROVIDER_API CALL
Abdi Cuadra  Urology  [address] [place] [address]  Phone: [phone]  Fax: [phone]  Follow Up Time:

## 2024-03-05 NOTE — ED PROVIDER NOTE - PROGRESS NOTE DETAILS
pain improved. ct with 6mm distal right uretal stone. labs grossly unremarkable. pending UA and pain reassessment. Evan DO: Received sign out from Dr. Ruiz.  CT reported Right hydronephrosis and hydroureter secondary to a 6 mm stone in the   distal right ureter.  UA wnl  Pt is well appearing, has no new complaints and able to walk with normal gait. Pt is stable for discharge and follow up with medical doctor. Pt educated on care and need for follow up. Discussed anticipatory guidance and return precautions. Questions answered. I had a detailed discussion with the patient regarding the historical points, exam findings, and any diagnostic results supporting the discharge diagnosis. Correct HR is 88 BPM.

## 2024-03-05 NOTE — ED PROVIDER NOTE - CLINICAL SUMMARY MEDICAL DECISION MAKING FREE TEXT BOX
43 year old male with left flank pain. PE as above.  labs, UA, ct abdo/pelvis, pain control, fluid bolus, reassess

## 2024-03-05 NOTE — ED PROVIDER NOTE - NSFOLLOWUPINSTRUCTIONS_ED_ALL_ED_FT
Renal colic is pain that is caused by a kidney stone. The pain can be sharp and very bad. It may be felt in your back, belly, side, or groin. It can cause nausea. Renal colic can come and go.    Follow these instructions at home:  Medicines    Take over-the-counter and prescription medicines only as told by your doctor.  If told, take steps to prevent problems with pooping (constipation). You may need to:  Take medicines. You will be told what medicines to take.  Eat foods that are high in fiber. These include beans, whole grains, and fresh fruits and vegetables.  Limit foods that are high in fat and sugar. These include fried or sweet foods.  Ask your doctor if you should avoid driving or using machines while you are taking your medicine.  Eating and drinking    Drink enough fluid to keep your pee (urine) pale yellow. You may be told to drink at least 8–10 glasses of water each day.  Follow instructions from your doctor about what you may eat and drink.  If told, change your diet. You may need to eat:  Less salt (sodium). Eat less than 2 grams (2,000 mg) of salt per day.  Less meat, poultry, fish, and eggs.  More fruits and vegetables.  Try not to eat spinach, rhubarb, sweet potatoes, or nuts.  General instructions    Collect pee samples as told by your doctor.  Strain your pee every time you pee (urinate), as told by your doctor. Use the strainer that your doctor gives you.  Do not throw out the kidney stone after you pass it. Keep the stone so it can be tested by your doctor.  Your doctor may give you more instructions. Make sure you know what you can and cannot do.    Contact a doctor if:  You have a fever or chills.  Your pee smells bad or looks cloudy.  You have pain or burning when you pee.  You have blood in your pee.  Get help right away if:  The pain in your side or groin gets worse all of a sudden.  You get confused.  You pass out.  This information is not intended to replace advice given to you by your health care provider. Make sure you discuss any questions you have with your health care provider.

## 2024-03-06 ENCOUNTER — TRANSCRIPTION ENCOUNTER (OUTPATIENT)
Age: 44
End: 2024-03-06

## 2024-03-06 VITALS
DIASTOLIC BLOOD PRESSURE: 78 MMHG | RESPIRATION RATE: 18 BRPM | SYSTOLIC BLOOD PRESSURE: 124 MMHG | HEART RATE: 82 BPM | OXYGEN SATURATION: 98 % | TEMPERATURE: 98 F

## 2024-03-06 LAB
APPEARANCE UR: CLEAR — SIGNIFICANT CHANGE UP
BILIRUB UR-MCNC: NEGATIVE — SIGNIFICANT CHANGE UP
COLOR SPEC: YELLOW — SIGNIFICANT CHANGE UP
DIFF PNL FLD: NEGATIVE — SIGNIFICANT CHANGE UP
GLUCOSE UR QL: NEGATIVE MG/DL — SIGNIFICANT CHANGE UP
KETONES UR-MCNC: NEGATIVE MG/DL — SIGNIFICANT CHANGE UP
LEUKOCYTE ESTERASE UR-ACNC: NEGATIVE — SIGNIFICANT CHANGE UP
NITRITE UR-MCNC: NEGATIVE — SIGNIFICANT CHANGE UP
PH UR: 6.5 — SIGNIFICANT CHANGE UP (ref 5–8)
PROT UR-MCNC: NEGATIVE MG/DL — SIGNIFICANT CHANGE UP
SP GR SPEC: 1.02 — SIGNIFICANT CHANGE UP (ref 1–1.03)
UROBILINOGEN FLD QL: 0.2 MG/DL — SIGNIFICANT CHANGE UP (ref 0.2–1)

## 2024-03-06 PROCEDURE — 96361 HYDRATE IV INFUSION ADD-ON: CPT

## 2024-03-06 PROCEDURE — 85025 COMPLETE CBC W/AUTO DIFF WBC: CPT

## 2024-03-06 PROCEDURE — 36415 COLL VENOUS BLD VENIPUNCTURE: CPT

## 2024-03-06 PROCEDURE — 99284 EMERGENCY DEPT VISIT MOD MDM: CPT | Mod: 25

## 2024-03-06 PROCEDURE — 74176 CT ABD & PELVIS W/O CONTRAST: CPT | Mod: MC

## 2024-03-06 PROCEDURE — 87086 URINE CULTURE/COLONY COUNT: CPT

## 2024-03-06 PROCEDURE — 96374 THER/PROPH/DIAG INJ IV PUSH: CPT

## 2024-03-06 PROCEDURE — 96375 TX/PRO/DX INJ NEW DRUG ADDON: CPT

## 2024-03-06 PROCEDURE — 81003 URINALYSIS AUTO W/O SCOPE: CPT

## 2024-03-06 PROCEDURE — 83690 ASSAY OF LIPASE: CPT

## 2024-03-06 PROCEDURE — 80053 COMPREHEN METABOLIC PANEL: CPT

## 2024-03-06 PROCEDURE — 83735 ASSAY OF MAGNESIUM: CPT

## 2024-03-06 RX ORDER — IBUPROFEN 200 MG
1 TABLET ORAL
Qty: 20 | Refills: 0
Start: 2024-03-06

## 2024-03-06 RX ORDER — ACETAMINOPHEN WITH CODEINE 300MG-30MG
1 TABLET ORAL
Qty: 10 | Refills: 0
Start: 2024-03-06

## 2024-03-06 RX ORDER — TAMSULOSIN HYDROCHLORIDE 0.4 MG/1
1 CAPSULE ORAL
Qty: 7 | Refills: 0
Start: 2024-03-06 | End: 2024-03-12

## 2024-03-07 ENCOUNTER — TRANSCRIPTION ENCOUNTER (OUTPATIENT)
Age: 44
End: 2024-03-07

## 2024-03-07 LAB
CULTURE RESULTS: SIGNIFICANT CHANGE UP
SPECIMEN SOURCE: SIGNIFICANT CHANGE UP

## 2024-03-08 ENCOUNTER — TRANSCRIPTION ENCOUNTER (OUTPATIENT)
Age: 44
End: 2024-03-08

## 2024-03-13 ENCOUNTER — APPOINTMENT (OUTPATIENT)
Dept: UROLOGY | Facility: CLINIC | Age: 44
End: 2024-03-13
Payer: COMMERCIAL

## 2024-03-13 VITALS
DIASTOLIC BLOOD PRESSURE: 91 MMHG | WEIGHT: 170 LBS | SYSTOLIC BLOOD PRESSURE: 137 MMHG | BODY MASS INDEX: 26.68 KG/M2 | OXYGEN SATURATION: 97 % | RESPIRATION RATE: 16 BRPM | HEART RATE: 80 BPM | TEMPERATURE: 98.1 F | HEIGHT: 67 IN

## 2024-03-13 PROCEDURE — G2211 COMPLEX E/M VISIT ADD ON: CPT

## 2024-03-13 PROCEDURE — 99214 OFFICE O/P EST MOD 30 MIN: CPT

## 2024-03-13 NOTE — HISTORY OF PRESENT ILLNESS
[None] : no symptoms [FreeTextEntry1] : Mr. Cantu is a very pleasant 43 year old man here today for right ureteral stone. He reports last week he started having severe abdominal pain so he went to the ED. UC negative. CT images reviewed show 6 mm distal right ureteral stone with mild right hydronephrosis. He reports that since since hospital visit he has felt well with no pain. he does reports some gross hematuria yesterday. Denies any dysuria.

## 2024-03-13 NOTE — ASSESSMENT
[FreeTextEntry1] : Mr. Cantu is a very pleasant 43 year old man here today for history of kidney stones, new complaint of abdominal pain and right ureteral stone. He reports last week he started having severe abdominal pain so he went to the ED. UC negative. CT images reviewed show 6 mm distal right ureteral stone with mild right hydronephrosis. He reports that since since hospital visit he has felt well with no pain. he does reports some gross hematuria yesterday. Denies any dysuria. He reports dizziness with tamsulosin. Increase hydration. CT stone hunt in 2 weeks. Patient will bring the stone in for analysis if he passes it RTO in 3-4 weeks.  Patient is being seen today for evaluation and management of a chronic and longitudinal ongoing condition and I am of the primary treating physician

## 2024-03-13 NOTE — PHYSICAL EXAM
[Normal Appearance] : normal appearance [Well Groomed] : well groomed [General Appearance - In No Acute Distress] : no acute distress [Edema] : no peripheral edema [Respiration, Rhythm And Depth] : normal respiratory rhythm and effort [Exaggerated Use Of Accessory Muscles For Inspiration] : no accessory muscle use [Abdomen Tenderness] : non-tender [Abdomen Soft] : soft [Costovertebral Angle Tenderness] : no ~M costovertebral angle tenderness [Normal Station and Gait] : the gait and station were normal for the patient's age [No Focal Deficits] : no focal deficits [] : no rash [Oriented To Time, Place, And Person] : oriented to person, place, and time [Affect] : the affect was normal [Mood] : the mood was normal [No Palpable Adenopathy] : no palpable adenopathy

## 2024-03-25 ENCOUNTER — APPOINTMENT (OUTPATIENT)
Dept: CT IMAGING | Facility: CLINIC | Age: 44
End: 2024-03-25
Payer: COMMERCIAL

## 2024-03-25 PROCEDURE — 74176 CT ABD & PELVIS W/O CONTRAST: CPT

## 2024-04-03 ENCOUNTER — APPOINTMENT (OUTPATIENT)
Dept: UROLOGY | Facility: CLINIC | Age: 44
End: 2024-04-03
Payer: COMMERCIAL

## 2024-04-03 VITALS
TEMPERATURE: 98.8 F | OXYGEN SATURATION: 99 % | DIASTOLIC BLOOD PRESSURE: 77 MMHG | WEIGHT: 170 LBS | HEART RATE: 85 BPM | HEIGHT: 67 IN | SYSTOLIC BLOOD PRESSURE: 123 MMHG | BODY MASS INDEX: 26.68 KG/M2

## 2024-04-03 DIAGNOSIS — N20.1 CALCULUS OF URETER: ICD-10-CM

## 2024-04-03 PROCEDURE — 99214 OFFICE O/P EST MOD 30 MIN: CPT

## 2024-04-03 PROCEDURE — G2211 COMPLEX E/M VISIT ADD ON: CPT

## 2024-04-03 NOTE — ASSESSMENT
[FreeTextEntry1] : Mr. Cantu is a very pleasant 43 year old man here today for right ureteral stone. He reports feeling okay since he was here last. He continues to strain his urine. He has not seen the stone pass. Still has episodes of urgency and frequency. Denies any hematuria or dysuria. CT images reviewed shows unchanged 5 mm distal right ureteral stone. UC. I discussed the different treatment modalities for nephrolithiasis with the patient, including medical management, spontaneous stone passage, percutaneous stone extraction, extracorporeal shock wave lithotripsy, and ureteroscopy with laser lithotripsy and stone extraction. Given the size and location of the stone, I recommend and the patient opted to proceed with ureteroscopy.  The risks, benefits, and alternatives to ureteroscopy were discussed with the patient, including but not limited to pain, infection, bleeding, bladder injury, ureteral injury, renal injury, and treatment failure.  I also discussed the possible need for a temporary ureteral stent.  I discussed the possible side effects of a temporary ureteral stent, including flank pain, hematuria, and bladder spasms.  The patient understands that a stent is a temporary implant that must be removed in the future.  The patient wishes to proceed and we will schedule the surgery for the near future.  Patient is being seen today for evaluation and management of a chronic and longitudinal ongoing condition and I am of the primary treating physician

## 2024-04-03 NOTE — HISTORY OF PRESENT ILLNESS
[None] : None [FreeTextEntry1] : Mr. Cantu is a very pleasant 43 year old man here today for right ureteral stone. He reports feeling okay since he was here last. He continues to strain his urine. He has not seen the stone pass. Still has episodes of urgency and frequency. Denies any hematuria or dysuria.

## 2024-04-03 NOTE — PHYSICAL EXAM
[Normal Appearance] : normal appearance [Well Groomed] : well groomed [General Appearance - In No Acute Distress] : no acute distress [Edema] : no peripheral edema [Exaggerated Use Of Accessory Muscles For Inspiration] : no accessory muscle use [Respiration, Rhythm And Depth] : normal respiratory rhythm and effort [Abdomen Soft] : soft [Abdomen Tenderness] : non-tender [Costovertebral Angle Tenderness] : no ~M costovertebral angle tenderness [Normal Station and Gait] : the gait and station were normal for the patient's age [No Focal Deficits] : no focal deficits [] : no rash [Oriented To Time, Place, And Person] : oriented to person, place, and time [Affect] : the affect was normal [No Palpable Adenopathy] : no palpable adenopathy [Mood] : the mood was normal

## 2024-04-05 LAB
BACTERIA UR CULT: NORMAL
BACTERIA UR CULT: NORMAL

## 2024-04-16 ENCOUNTER — OUTPATIENT (OUTPATIENT)
Dept: OUTPATIENT SERVICES | Facility: HOSPITAL | Age: 44
LOS: 1 days | End: 2024-04-16
Payer: COMMERCIAL

## 2024-04-16 ENCOUNTER — RESULT REVIEW (OUTPATIENT)
Age: 44
End: 2024-04-16

## 2024-04-16 VITALS
HEIGHT: 67 IN | RESPIRATION RATE: 16 BRPM | WEIGHT: 162.04 LBS | HEART RATE: 89 BPM | SYSTOLIC BLOOD PRESSURE: 136 MMHG | OXYGEN SATURATION: 99 % | TEMPERATURE: 98 F | DIASTOLIC BLOOD PRESSURE: 88 MMHG

## 2024-04-16 DIAGNOSIS — N20.1 CALCULUS OF URETER: ICD-10-CM

## 2024-04-16 DIAGNOSIS — F32.A DEPRESSION, UNSPECIFIED: ICD-10-CM

## 2024-04-16 DIAGNOSIS — E78.5 HYPERLIPIDEMIA, UNSPECIFIED: ICD-10-CM

## 2024-04-16 DIAGNOSIS — Z87.81 PERSONAL HISTORY OF (HEALED) TRAUMATIC FRACTURE: Chronic | ICD-10-CM

## 2024-04-16 DIAGNOSIS — Z01.818 ENCOUNTER FOR OTHER PREPROCEDURAL EXAMINATION: ICD-10-CM

## 2024-04-16 DIAGNOSIS — I10 ESSENTIAL (PRIMARY) HYPERTENSION: ICD-10-CM

## 2024-04-16 DIAGNOSIS — N20.0 CALCULUS OF KIDNEY: Chronic | ICD-10-CM

## 2024-04-16 LAB
APTT BLD: 32.2 SEC — SIGNIFICANT CHANGE UP (ref 24.5–35.6)
INR BLD: 1 RATIO — SIGNIFICANT CHANGE UP (ref 0.85–1.18)
PROTHROM AB SERPL-ACNC: 11.4 SEC — SIGNIFICANT CHANGE UP (ref 9.5–13)

## 2024-04-16 PROCEDURE — 71046 X-RAY EXAM CHEST 2 VIEWS: CPT | Mod: 26

## 2024-04-16 NOTE — H&P PST ADULT - PROBLEM SELECTOR PLAN 4
Scheduled for  Cystoscopy, Right Ureteroscopy with Laser Lithotripsy and Stone Extraction on 4/25/24  Preoperative instructions discussed and given to patient.   Patient agrees to follow up with surgeon's office for instructions prior to surgery   Discussed preprocedure skin preparation using  chlorhexidine gluconate 4% solution three days prior to  surgery - including the day of surgery  Instructed patient to avoid aspirin and aspirin products, over the counter medications such as vitamins and herbal medications, one week prior to surgery.  Take Tylenol as needed for pain  Follow up with PCP postoperatively for management of chronic conditions  Patient verbalized understanding of instructions

## 2024-04-16 NOTE — H&P PST ADULT - PROBLEM SELECTOR PLAN 3
Take paxil the morning of surgery with a sip of water  Follow up with PCP postoperatively for management of depression

## 2024-04-16 NOTE — H&P PST ADULT - ASSESSMENT
42 y/o male with history of hypertension, HLD, fatty liver, tobacco dependence, depression is diagnosed with calculus of the ureter  STOP BANG SCORE IS 1

## 2024-04-16 NOTE — H&P PST ADULT - PROBLEM SELECTOR PLAN 1
Take antihypertensive medication - amlodipine the morning of surgery with a sip of water  Follow up with PCP postoperatively for management of hypertension

## 2024-04-16 NOTE — H&P PST ADULT - MUSCULOSKELETAL
details… ROM intact/decreased ROM/decreased ROM due to pain/normal gait/strength 5/5 bilateral upper extremities/decreased strength

## 2024-04-16 NOTE — H&P PST ADULT - NSICDXPROCEDURE_GEN_ALL_CORE_FT
PROCEDURES:  Lithotripsy, ureter 16-Apr-2024 11:45:53  Celine Kitchen  Cystoscopy with right ureteroscopy and laser lithotripsy 16-Apr-2024 11:46:10  Celine Kitchen

## 2024-04-16 NOTE — H&P PST ADULT - HISTORY OF PRESENT ILLNESS
42 y/o male with history of hypertension tobacco dependence, depression complains severe left flank pain radiating to right lower abdominal quadrant   on and off for the past month. CT Abdomen and Pelvis reportedly revealed    . He is diagnosed with calculus of the ureter and is scheduled for  Cystoscopy, Right Ureteroscopy with Laser Lithotripsy and Stone Extraction on 4/25/24 42 y/o male with history of hypertension, HLD, fatty liver, tobacco dependence, depression complains severe left flank pain radiating to right lower abdominal quadrant on and off for the past month. CT Abdomen and Pelvis reportedly revealed    . He is diagnosed with calculus of the ureter and is scheduled for  Cystoscopy, Right Ureteroscopy with Laser Lithotripsy and Stone Extraction on 4/25/24 44 y/o male with history of hypertension, HLD, fatty liver, tobacco dependence, depression complains severe left flank pain radiating to right lower abdominal quadrant on and off for the past month. CT Abdomen and Pelvis reportedly revealed a 6mm stone in the distal right ureter. He is diagnosed with calculus of the ureter and is scheduled for  Cystoscopy, Right Ureteroscopy with Laser Lithotripsy and Stone Extraction on 4/25/24

## 2024-04-17 ENCOUNTER — NON-APPOINTMENT (OUTPATIENT)
Age: 44
End: 2024-04-17

## 2024-04-17 PROCEDURE — 85730 THROMBOPLASTIN TIME PARTIAL: CPT

## 2024-04-17 PROCEDURE — 71046 X-RAY EXAM CHEST 2 VIEWS: CPT

## 2024-04-17 PROCEDURE — 85610 PROTHROMBIN TIME: CPT

## 2024-04-17 PROCEDURE — 36415 COLL VENOUS BLD VENIPUNCTURE: CPT

## 2024-04-17 PROCEDURE — G0463: CPT

## 2024-04-24 ENCOUNTER — TRANSCRIPTION ENCOUNTER (OUTPATIENT)
Age: 44
End: 2024-04-24

## 2024-04-25 ENCOUNTER — TRANSCRIPTION ENCOUNTER (OUTPATIENT)
Age: 44
End: 2024-04-25

## 2024-04-25 ENCOUNTER — RESULT REVIEW (OUTPATIENT)
Age: 44
End: 2024-04-25

## 2024-04-25 ENCOUNTER — APPOINTMENT (OUTPATIENT)
Dept: UROLOGY | Facility: HOSPITAL | Age: 44
End: 2024-04-25

## 2024-04-25 ENCOUNTER — OUTPATIENT (OUTPATIENT)
Dept: OUTPATIENT SERVICES | Facility: HOSPITAL | Age: 44
LOS: 1 days | End: 2024-04-25
Payer: COMMERCIAL

## 2024-04-25 VITALS
RESPIRATION RATE: 15 BRPM | OXYGEN SATURATION: 98 % | HEART RATE: 69 BPM | SYSTOLIC BLOOD PRESSURE: 112 MMHG | TEMPERATURE: 97 F | DIASTOLIC BLOOD PRESSURE: 79 MMHG

## 2024-04-25 VITALS
OXYGEN SATURATION: 100 % | HEART RATE: 88 BPM | SYSTOLIC BLOOD PRESSURE: 126 MMHG | TEMPERATURE: 98 F | HEIGHT: 67 IN | RESPIRATION RATE: 16 BRPM | DIASTOLIC BLOOD PRESSURE: 89 MMHG | WEIGHT: 162.04 LBS

## 2024-04-25 DIAGNOSIS — N20.0 CALCULUS OF KIDNEY: Chronic | ICD-10-CM

## 2024-04-25 DIAGNOSIS — Z87.81 PERSONAL HISTORY OF (HEALED) TRAUMATIC FRACTURE: Chronic | ICD-10-CM

## 2024-04-25 DIAGNOSIS — Z01.818 ENCOUNTER FOR OTHER PREPROCEDURAL EXAMINATION: ICD-10-CM

## 2024-04-25 DIAGNOSIS — N20.1 CALCULUS OF URETER: ICD-10-CM

## 2024-04-25 PROCEDURE — 76000 FLUOROSCOPY <1 HR PHYS/QHP: CPT

## 2024-04-25 PROCEDURE — 52356 CYSTO/URETERO W/LITHOTRIPSY: CPT | Mod: RT

## 2024-04-25 PROCEDURE — C1758: CPT

## 2024-04-25 PROCEDURE — 52356 CYSTO/URETERO W/LITHOTRIPSY: CPT | Mod: LT

## 2024-04-25 PROCEDURE — 88300 SURGICAL PATH GROSS: CPT | Mod: 26

## 2024-04-25 PROCEDURE — 88300 SURGICAL PATH GROSS: CPT

## 2024-04-25 PROCEDURE — C1889: CPT

## 2024-04-25 PROCEDURE — 52341 CYSTO W/URETER STRICTURE TX: CPT | Mod: RT,59

## 2024-04-25 PROCEDURE — C2617: CPT

## 2024-04-25 PROCEDURE — C1769: CPT

## 2024-04-25 PROCEDURE — 74420 UROGRAPHY RTRGR +-KUB: CPT | Mod: 26

## 2024-04-25 PROCEDURE — 82365 CALCULUS SPECTROSCOPY: CPT

## 2024-04-25 DEVICE — URETERAL STENT PERCUFLEX PLUS 7FR 24CM: Type: IMPLANTABLE DEVICE | Site: RIGHT | Status: FUNCTIONAL

## 2024-04-25 DEVICE — URETERAL CATH OPEN END FLEXI-TIP 5FR .038" X 70CM: Type: IMPLANTABLE DEVICE | Site: RIGHT | Status: FUNCTIONAL

## 2024-04-25 DEVICE — STONE BASKET ZEROTIP NITINOL 4-WIRE 1.9FR 120CM X 12MM: Type: IMPLANTABLE DEVICE | Site: RIGHT | Status: FUNCTIONAL

## 2024-04-25 DEVICE — GUIDEWIRE SENSOR DUAL-FLEX NITINOL STRAIGHT .038" X 150CM: Type: IMPLANTABLE DEVICE | Site: RIGHT | Status: FUNCTIONAL

## 2024-04-25 DEVICE — LASER FIBER MOSES 200 D/F/L: Type: IMPLANTABLE DEVICE | Site: RIGHT | Status: FUNCTIONAL

## 2024-04-25 RX ORDER — FENTANYL CITRATE 50 UG/ML
25 INJECTION INTRAVENOUS
Refills: 0 | Status: DISCONTINUED | OUTPATIENT
Start: 2024-04-25 | End: 2024-04-25

## 2024-04-25 RX ORDER — ATORVASTATIN CALCIUM 80 MG/1
1 TABLET, FILM COATED ORAL
Refills: 0 | DISCHARGE

## 2024-04-25 RX ORDER — HYDROMORPHONE HYDROCHLORIDE 2 MG/ML
0.5 INJECTION INTRAMUSCULAR; INTRAVENOUS; SUBCUTANEOUS
Refills: 0 | Status: DISCONTINUED | OUTPATIENT
Start: 2024-04-25 | End: 2024-04-25

## 2024-04-25 RX ORDER — ACETAMINOPHEN 500 MG
1000 TABLET ORAL ONCE
Refills: 0 | Status: DISCONTINUED | OUTPATIENT
Start: 2024-04-25 | End: 2024-04-25

## 2024-04-25 RX ORDER — AMLODIPINE BESYLATE 2.5 MG/1
1 TABLET ORAL
Qty: 0 | Refills: 0 | DISCHARGE

## 2024-04-25 RX ORDER — ONDANSETRON 8 MG/1
4 TABLET, FILM COATED ORAL ONCE
Refills: 0 | Status: DISCONTINUED | OUTPATIENT
Start: 2024-04-25 | End: 2024-04-25

## 2024-04-25 RX ORDER — SODIUM CHLORIDE 9 MG/ML
3 INJECTION INTRAMUSCULAR; INTRAVENOUS; SUBCUTANEOUS EVERY 8 HOURS
Refills: 0 | Status: DISCONTINUED | OUTPATIENT
Start: 2024-04-25 | End: 2024-04-25

## 2024-04-25 NOTE — ASU PATIENT PROFILE, ADULT - NSICDXPASTMEDICALHX_GEN_ALL_CORE_FT
Pt returning your phone call. Pt can be reached at [phone].     Thank you,  Albaro Forrester
Returned call. Voicemail message left.
Spoke with patient. Will send in prescription for flecainide 50mg twice daily.   Patient will
PAST MEDICAL HISTORY:  H/O: depression     HLD (hyperlipidemia)     Hypertension

## 2024-04-25 NOTE — ASU PATIENT PROFILE, ADULT - NS SC CAGE ALCOHOL GUILTY ABOUT
32 yo female with PMH that includes recent delivery 9/2018 currently breastfeeding, who presented with pleuritic pain and fever and found with acute on chronic PE  she was started on anticoagulation and developed a significant headache  CT of the head overnight not fully diagnostic  HA improved but persists this am      # Acute on chronic PE: BNP negative, serial CE negative. now on room air  - cont therapeutic anticoagulation with lovenox 1mg/kg q12  - warfarin tonight based on INR, f/u pending am labs  ---- given education on enoxaparin and warfarin and bleeding precs and diet  - nutrition to reinforce warfarin diet  - will need warfarin outpatient followup established  - f/u TTE  - f/u heme eval given postpartum state and evidence for chronic PE underneath the acute process  - advised to cont to pump and discard for now and to reach out to OB and pediatrician re current situation      # Headace: occipital: improved from yesterday but not resolved. CT not fully diagnostic. MRI n/a at this hospital and not feasible to arrange trasport to MRI in expedient fashion, therefore with persistent headache and need for ongoing AC best to repeat CT  - discussed R/B/A with patient, CT ordered  - cont to hydrate      # low grade temp: could be PE related vs other. resolving  - check UA Ucx for completeness      # Serpiginous density in the right lower lobe, which may represent a vascular malformation. I d/w pulm and was advised does not represent an contra-indication to AC      # Coordination of care:  - VTE ppx: on systemic AC  - advised patient and  re no pregnancy while on coumadin, and given PE to d/w OB prior to any desired future pregnancy      ---------------------------------------------------  lit search on meds:    enoxaparin Pregnancy category B = No evidence of risk in studies    Enoxaparin: Breast-Feeding Considerations It is not known if enoxaparin is present in breast milk. Small amounts of another LMWH have been detected in breast milk; however, because they have a low oral bioavailability, LMWHs are unlikely to cause adverse events in a breastfeeding infant. According to the , the decision to continue or discontinue breastfeeding during therapy should take into account the risk of infant exposure, the benefits of breastfeeding to the infant, and benefits of treatment to the mother. However, antithrombotic guidelines state that use of LMWH may be continued in breastfeeding women (Curran 2012).        warfarin pregnancy implications: Teratogenic effects have been reported following first trimester exposure       warfarin breast feeding considerations:  Based on available data, warfarin is not present in breast milk.  Breastfeeding women may be treated with warfarin. According to the American College of Chest [employer] (ACCP), warfarin may be used in lactating women who wish to breastfeed their infants (ACCP [Curran 2012]). The  recommends monitoring of breastfeeding infants for bruising or bleeding. 34 yo female with PMH that includes recent delivery 9/2018 currently breastfeeding, who presented with pleuritic pain and fever and found with acute on chronic PE  she was started on anticoagulation and developed a significant headache  CT of the head overnight not fully diagnostic  HA improved but persists this am      # Acute on chronic PE: BNP negative, serial CE negative. now on room air  - cont therapeutic anticoagulation with lovenox 1mg/kg q12. she does not think she will be able to learn to self inject  - warfarin tonight based on INR, f/u pending am labs  - given education on enoxaparin and warfarin and bleeding precs and diet  - nutrition to reinforce warfarin diet  - will need warfarin outpatient followup established. she sees Dr Lora at [facility]  - f/u TTE  - f/u heme eval given postpartum state and evidence for chronic PE underneath the acute process  - advised to cont to pump and discard for now and to reach out to OB and pediatrician re current situation      # Headache occipital: improved from yesterday but not resolved. CT not fully diagnostic. MRI n/a at this hospital and not feasible to arrange transport to outside MRI in expedient fashion, therefore with persistent headache and need for ongoing AC best to repeat CT  - discussed R/B/A with patient, CT ordered  - cont to hydrate      # low grade temp: could be PE related vs other. resolving  - check UA Ucx for completeness      # Serpiginous density in the right lower lobe, which may represent a vascular malformation. I d/w pulm and was advised does not represent an contra-indication to AC      # Coordination of care:  - VTE ppx: on systemic AC  - advised patient and  re no pregnancy while on coumadin, and given PE to d/w OB prior to any desired future pregnancy      ---------------------------------------------------  lit search on meds:    enoxaparin Pregnancy category B = No evidence of risk in studies    Enoxaparin: Breast-Feeding Considerations It is not known if enoxaparin is present in breast milk. Small amounts of another LMWH have been detected in breast milk; however, because they have a low oral bioavailability, LMWHs are unlikely to cause adverse events in a breastfeeding infant. According to the , the decision to continue or discontinue breastfeeding during therapy should take into account the risk of infant exposure, the benefits of breastfeeding to the infant, and benefits of treatment to the mother. However, antithrombotic guidelines state that use of LMWH may be continued in breastfeeding women (Curran 2012).        warfarin pregnancy implications: Teratogenic effects have been reported following first trimester exposure       warfarin breast feeding considerations:  Based on available data, warfarin is not present in breast milk.  Breastfeeding women may be treated with warfarin. According to the American College of Chest Physicians (ACCP), warfarin may be used in lactating women who wish to breastfeed their infants (ACCP [Curran 2012]). The  recommends monitoring of breastfeeding infants for bruising or bleeding. no

## 2024-04-25 NOTE — ASU PATIENT PROFILE, ADULT - FALL HARM RISK - UNIVERSAL INTERVENTIONS
Bed in lowest position, wheels locked, appropriate side rails in place/Call bell, personal items and telephone in reach/Instruct patient to call for assistance before getting out of bed or chair/Non-slip footwear when patient is out of bed/[place] to call system/Physically safe environment - no spills, clutter or unnecessary equipment/Purposeful Proactive Rounding/Room/bathroom lighting operational, light cord in reach

## 2024-04-25 NOTE — ASU DISCHARGE PLAN (ADULT/PEDIATRIC) - NS MD DC FALL RISK RISK
For information on Fall & Injury Prevention, visit: [email or web address] OR  [email or web address] OR  https://www.cdc.gov/steadi/patient.html

## 2024-04-25 NOTE — ASU DISCHARGE PLAN (ADULT/PEDIATRIC) - CARE PROVIDER_API CALL
Abdi Cuadra  Urology  [address] [place] [address]  Phone: [phone]  Fax: [phone]  Follow Up Time: 2 weeks

## 2024-04-26 PROBLEM — E78.5 HYPERLIPIDEMIA, UNSPECIFIED: Chronic | Status: ACTIVE | Noted: 2024-04-16

## 2024-05-03 LAB
CELL MATERIAL STONE EST-MCNT: SIGNIFICANT CHANGE UP
LABORATORY COMMENT REPORT: SIGNIFICANT CHANGE UP
NIDUS STONE QN: SIGNIFICANT CHANGE UP

## 2024-05-06 LAB — SURGICAL PATHOLOGY STUDY: SIGNIFICANT CHANGE UP

## 2024-05-08 ENCOUNTER — APPOINTMENT (OUTPATIENT)
Dept: UROLOGY | Facility: CLINIC | Age: 44
End: 2024-05-08
Payer: COMMERCIAL

## 2024-05-08 VITALS
TEMPERATURE: 98.2 F | HEIGHT: 67 IN | DIASTOLIC BLOOD PRESSURE: 89 MMHG | WEIGHT: 170 LBS | OXYGEN SATURATION: 99 % | BODY MASS INDEX: 26.68 KG/M2 | HEART RATE: 103 BPM | SYSTOLIC BLOOD PRESSURE: 136 MMHG

## 2024-05-08 DIAGNOSIS — N13.5 CROSSING VESSEL AND STRICTURE OF URETER W/OUT HYDRONEPHROSIS: ICD-10-CM

## 2024-05-08 PROCEDURE — 52310 CYSTOSCOPY AND TREATMENT: CPT

## 2024-06-18 ENCOUNTER — APPOINTMENT (OUTPATIENT)
Dept: UROLOGY | Facility: CLINIC | Age: 44
End: 2024-06-18
Payer: COMMERCIAL

## 2024-06-18 VITALS
WEIGHT: 170 LBS | TEMPERATURE: 97.9 F | BODY MASS INDEX: 26.68 KG/M2 | DIASTOLIC BLOOD PRESSURE: 87 MMHG | SYSTOLIC BLOOD PRESSURE: 141 MMHG | HEART RATE: 90 BPM | HEIGHT: 67 IN | OXYGEN SATURATION: 99 %

## 2024-06-18 VITALS
DIASTOLIC BLOOD PRESSURE: 92 MMHG | SYSTOLIC BLOOD PRESSURE: 144 MMHG | OXYGEN SATURATION: 98 % | TEMPERATURE: 97.2 F | HEART RATE: 88 BPM

## 2024-06-18 DIAGNOSIS — N20.0 CALCULUS OF KIDNEY: ICD-10-CM

## 2024-06-18 DIAGNOSIS — N48.1 BALANITIS: ICD-10-CM

## 2024-06-18 DIAGNOSIS — N52.9 MALE ERECTILE DYSFUNCTION, UNSPECIFIED: ICD-10-CM

## 2024-06-18 DIAGNOSIS — F52.4 PREMATURE EJACULATION: ICD-10-CM

## 2024-06-18 PROCEDURE — 76775 US EXAM ABDO BACK WALL LIM: CPT

## 2024-06-18 PROCEDURE — G2211 COMPLEX E/M VISIT ADD ON: CPT | Mod: NC

## 2024-06-18 PROCEDURE — 99214 OFFICE O/P EST MOD 30 MIN: CPT | Mod: 25

## 2024-06-18 RX ORDER — LIDOCAINE AND PRILOCAINE 25; 25 MG/G; MG/G
2.5-2.5 CREAM TOPICAL
Qty: 30 | Refills: 0 | Status: ACTIVE | COMMUNITY
Start: 2024-06-18 | End: 1900-01-01

## 2024-06-18 NOTE — HISTORY OF PRESENT ILLNESS
[FreeTextEntry1] : Very pleasant 43-year-old gentleman who presents for follow-up of kidney stones, ED, balanitis.  He recently underwent a right ureteroscopy with laser lithotripsy and stone extraction for a distal right ureteral stone.  At the time of the procedure he was noted to have narrowing at the distal ureter/UVJ.  He reports that he feels well after stent was removed in May 2024.  Litholink urinalysis from November 2022 demonstrated low urine volume at 1.52 L, low citrate at 301, elevated supersaturation calcium oxalate 5.63, elevated urine calcium at 221, elevated supersaturation of calcium phosphate 1.71, pH 6.447.  Reports that he continues to use paroxetine but wishes to try a cream for premature ejaculation in combination with paroxetine.

## 2024-06-18 NOTE — ASSESSMENT
[FreeTextEntry1] : Very pleasant 43-year-old gentleman who presents for follow-up of kidney stones -Litholink urinalysis from November 2022 demonstrates low urine volume at 1.52 L, low citrate at 301, elevated supersaturation calcium oxalate 5.63, elevated urine calcium at 221, elevated supersaturation of calcium phosphate 1.71, pH 6.447 -Recent calculus analysis 70% calcium oxalate monohydrate, 20% calcium oxalate dihydrate, 10% calcium phosphate (apatite) -Renal ultrasound images reviewed with the patient today demonstrating no kidney stones, hydronephrosis, nor renal masses -Repeat Litholink urinalysis -Continue paroxetine -Start lidocaine-prilocaine cream for premature ejaculation -Follow-up in 1 month  Patient is being seen today for evaluation and management of a chronic and longitudinal ongoing condition and I am of the primary treating physician

## 2024-07-11 ENCOUNTER — TRANSCRIPTION ENCOUNTER (OUTPATIENT)
Age: 44
End: 2024-07-11

## 2024-07-12 ENCOUNTER — TRANSCRIPTION ENCOUNTER (OUTPATIENT)
Age: 44
End: 2024-07-12

## 2024-07-19 ENCOUNTER — APPOINTMENT (OUTPATIENT)
Dept: UROLOGY | Facility: CLINIC | Age: 44
End: 2024-07-19
Payer: COMMERCIAL

## 2024-07-19 VITALS
OXYGEN SATURATION: 98 % | DIASTOLIC BLOOD PRESSURE: 89 MMHG | HEIGHT: 67 IN | HEART RATE: 81 BPM | BODY MASS INDEX: 26.68 KG/M2 | SYSTOLIC BLOOD PRESSURE: 153 MMHG | WEIGHT: 170 LBS

## 2024-07-19 DIAGNOSIS — N48.1 BALANITIS: ICD-10-CM

## 2024-07-19 DIAGNOSIS — N52.9 MALE ERECTILE DYSFUNCTION, UNSPECIFIED: ICD-10-CM

## 2024-07-19 DIAGNOSIS — F52.4 PREMATURE EJACULATION: ICD-10-CM

## 2024-07-19 DIAGNOSIS — N20.0 CALCULUS OF KIDNEY: ICD-10-CM

## 2024-07-19 DIAGNOSIS — A60.02 HERPESVIRAL INFECTION OF OTHER MALE GENITAL ORGANS: ICD-10-CM

## 2024-07-19 PROCEDURE — 99214 OFFICE O/P EST MOD 30 MIN: CPT

## 2024-07-19 PROCEDURE — G2211 COMPLEX E/M VISIT ADD ON: CPT

## 2024-07-19 RX ORDER — NYSTATIN AND TRIAMCINOLONE ACETONIDE 100000; 1 MG/G; MG/G
100000-0.1 CREAM TOPICAL 3 TIMES DAILY
Qty: 60 | Refills: 1 | Status: ACTIVE | COMMUNITY
Start: 2024-07-19 | End: 1900-01-01

## 2024-08-07 ENCOUNTER — APPOINTMENT (OUTPATIENT)
Dept: UROLOGY | Facility: CLINIC | Age: 44
End: 2024-08-07

## 2024-08-14 ENCOUNTER — APPOINTMENT (OUTPATIENT)
Dept: UROLOGY | Facility: CLINIC | Age: 44
End: 2024-08-14
Payer: COMMERCIAL

## 2024-08-14 VITALS
HEART RATE: 102 BPM | HEIGHT: 67 IN | OXYGEN SATURATION: 99 % | WEIGHT: 170 LBS | DIASTOLIC BLOOD PRESSURE: 92 MMHG | TEMPERATURE: 96.6 F | BODY MASS INDEX: 26.68 KG/M2 | SYSTOLIC BLOOD PRESSURE: 144 MMHG

## 2024-08-14 DIAGNOSIS — N20.0 CALCULUS OF KIDNEY: ICD-10-CM

## 2024-08-14 DIAGNOSIS — F52.4 PREMATURE EJACULATION: ICD-10-CM

## 2024-08-14 DIAGNOSIS — N52.9 MALE ERECTILE DYSFUNCTION, UNSPECIFIED: ICD-10-CM

## 2024-08-14 DIAGNOSIS — N48.1 BALANITIS: ICD-10-CM

## 2024-08-14 DIAGNOSIS — N13.5 CROSSING VESSEL AND STRICTURE OF URETER W/OUT HYDRONEPHROSIS: ICD-10-CM

## 2024-08-14 PROCEDURE — 99214 OFFICE O/P EST MOD 30 MIN: CPT | Mod: 25

## 2024-08-14 PROCEDURE — G2211 COMPLEX E/M VISIT ADD ON: CPT | Mod: NC

## 2024-08-14 PROCEDURE — 76775 US EXAM ABDO BACK WALL LIM: CPT

## 2024-08-14 NOTE — PHYSICAL EXAM
Discharge Summary    Date of Intake: 4/7/2020   Date of Last Session: 4/21/2020    Chief Complaint: treatment of substance use disorders.     Admission Diagnosis:   F11.1 Opioid use disorder, severe, dependence (CMS/HCC)  (primary encounter diagnosis)  F10.1 Alcohol use disorder, severe, dependence (CMS/HCC)    ICD-10 Diagnosis:    Opioid use disorder, severe, dependence (CMS/HCC)  (primary encounter diagnosis)  Alcohol use disorder, severe, dependence (CMS/HCC)     The above named patient was discharged from my care on 4/21/2020 for the following reason(s). Patient     Reason for Discharge:  Patient has completed treatment (mutual agreement)    Treatment Provided: (check all that apply)  Medication , Individual, Group and Psych Evaluation    Summary of Patient Progress Towards Goals in the Treatment Plan: Patient had good attendance and was actively engaged in group programming. Pt reported no cravings and remained sober throughout his stay as verified by random UDS and breathalyzers. Patient reported low anxiety and depression. Pt is open to attending meetings and has been talking to his boss who is sober and supportive. PT is hopeful to save money and do more with his kids. Pt has dreams of opening up his own [employer] company in the future. PT knows that summer can be a trigger for drinking and is focusing on consequences of use as well as legal issues. Pt wants to start working out and enjoying the park near his home.     3. Moderate emotional instability (2)- positive affect, lacking emotional insight into drinking  4. Preparation stage of change (3)- engaged in groups, open to meetings, willing to complete homework assignments  5. Moderate risk of relapse (3)- low cravings, needs to build sober support, triggered by the summer/warm weather  6. Moderate living environment (2)- living with roommate, hoping to move out     Discharge Recommendations: Patient encouraged to attend 16 sessions of Intensive  Outpatient Programming.    Remaining Discharge Needs:  - Refusal skills  - Self soothing skills     SBAR: Notification of discharge given to individual therapist on 4/21/2020. PHP therapist informed IOP therapist of patient's progress while in treatment as well as remaining discharge needs. PHP therapist provided extension if there were any further questions regarding patient care.    Provider: Danyell Fisher LPC, [facility], [facility]  Date: 4/21/2020    Clinical Supervisor: Minesh Terrell LCSW, [facility]  Date:  4/22/2020 11:03 AM    Patient Signature:  ___________________________ Date: _________     [Normal Appearance] : normal appearance [Well Groomed] : well groomed [General Appearance - In No Acute Distress] : no acute distress [Edema] : no peripheral edema [Respiration, Rhythm And Depth] : normal respiratory rhythm and effort [Exaggerated Use Of Accessory Muscles For Inspiration] : no accessory muscle use [Abdomen Soft] : soft [Abdomen Tenderness] : non-tender [Costovertebral Angle Tenderness] : no ~M costovertebral angle tenderness [Normal Station and Gait] : the gait and station were normal for the patient's age [Urinary Bladder Findings] : the bladder was normal on palpation [No Focal Deficits] : no focal deficits [] : no rash [Oriented To Time, Place, And Person] : oriented to person, place, and time [Affect] : the affect was normal [Mood] : the mood was normal [No Palpable Adenopathy] : no palpable adenopathy

## 2024-08-14 NOTE — ASSESSMENT
[FreeTextEntry1] : Very pleasant 44-year-old gentleman who presents for follow-up of kidney stones, premature ejaculation, erectile dysfunction, balanitis -Continue nystatin-triamcinolone cream for balanitis as needed -Continue sildenafil as needed for erectile dysfunction -Litholink results reviewed with the patient in detail demonstrating good urine volume, elevated urine calcium, elevated supersaturation of calcium phosphate, elevated urine pH --Urine calcium 298 --Urine citrate 449 --Urine pH 6.5-1 --Uric acid 813 --Sodium 227 --SS CaP 1.84 -We discussed the option to see a nutritionist or nephrologist for further evaluation and mitigation of risk factor for kidney stones.  He reports that he has seen a lot of doctors recently and would like to avoid this if possible -Repeat renal ultrasound in 6 months and follow-up at that time  Patient is being seen today for evaluation and management of a chronic and longitudinal ongoing condition and I am of the primary treating physician

## 2024-08-14 NOTE — HISTORY OF PRESENT ILLNESS
[FreeTextEntry1] : Very pleasant 44-year-old gentleman who presents for follow-up of kidney stones, premature ejaculation, erectile dysfunction, balanitis.  He reports that he feels well.  No additional episodes of balanitis.  He reports that his erections are stable.  He recently underwent a Litholink urinalysis which demonstrated good urine volume, elevated supersaturation calcium oxalate, elevated urine calcium, elevated supersaturation calcium phosphate, elevated urine pH.  Renal ultrasound today demonstrates no kidney stones, hydronephrosis, nor renal masses.

## 2024-09-24 ENCOUNTER — TRANSCRIPTION ENCOUNTER (OUTPATIENT)
Age: 44
End: 2024-09-24

## 2024-10-08 ENCOUNTER — TRANSCRIPTION ENCOUNTER (OUTPATIENT)
Age: 44
End: 2024-10-08

## 2024-10-23 ENCOUNTER — TRANSCRIPTION ENCOUNTER (OUTPATIENT)
Age: 44
End: 2024-10-23

## 2024-11-29 NOTE — ASU PATIENT PROFILE, ADULT - HEALTH/HEALTHCARE ANXIETIES, PROFILE
Problem: Adult Inpatient Plan of Care  Goal: Absence of Hospital-Acquired Illness or Injury  Intervention: Identify and Manage Fall Risk  Recent Flowsheet Documentation  Taken 11/29/2024 0200 by Bre Page RN  Safety Promotion/Fall Prevention: safety round/check completed  Taken 11/29/2024 0000 by Bre Page RN  Safety Promotion/Fall Prevention: safety round/check completed  Taken 11/28/2024 2200 by Bre Page RN  Safety Promotion/Fall Prevention: safety round/check completed  Taken 11/28/2024 2054 by Bre Page RN  Safety Promotion/Fall Prevention: safety round/check completed  Taken 11/28/2024 1930 by Bre Page RN  Safety Promotion/Fall Prevention: safety round/check completed  Intervention: Prevent Skin Injury  Recent Flowsheet Documentation  Taken 11/28/2024 2054 by Bre Page RN  Body Position: position changed independently  Intervention: Prevent and Manage VTE (Venous Thromboembolism) Risk  Recent Flowsheet Documentation  Taken 11/28/2024 2054 by Bre Page RN  VTE Prevention/Management: (eliquis) --   Goal Outcome Evaluation:      VSS.  Alert and oriented x3 forgetful.  A flutter .  Pt anxious and prn xanax given with relief.  NPO for cardioversion today.  Safety maintained.                                        none

## 2025-01-14 ENCOUNTER — TRANSCRIPTION ENCOUNTER (OUTPATIENT)
Age: 45
End: 2025-01-14

## 2025-02-14 ENCOUNTER — NON-APPOINTMENT (OUTPATIENT)
Age: 45
End: 2025-02-14

## 2025-02-14 ENCOUNTER — APPOINTMENT (OUTPATIENT)
Dept: UROLOGY | Facility: CLINIC | Age: 45
End: 2025-02-14
Payer: COMMERCIAL

## 2025-02-14 VITALS
BODY MASS INDEX: 25.58 KG/M2 | HEART RATE: 93 BPM | OXYGEN SATURATION: 97 % | SYSTOLIC BLOOD PRESSURE: 135 MMHG | DIASTOLIC BLOOD PRESSURE: 89 MMHG | TEMPERATURE: 97.3 F | HEIGHT: 67 IN | WEIGHT: 163 LBS

## 2025-02-14 DIAGNOSIS — F52.4 PREMATURE EJACULATION: ICD-10-CM

## 2025-02-14 DIAGNOSIS — N48.1 BALANITIS: ICD-10-CM

## 2025-02-14 DIAGNOSIS — N20.0 CALCULUS OF KIDNEY: ICD-10-CM

## 2025-02-14 DIAGNOSIS — N52.9 MALE ERECTILE DYSFUNCTION, UNSPECIFIED: ICD-10-CM

## 2025-02-14 PROCEDURE — G2211 COMPLEX E/M VISIT ADD ON: CPT

## 2025-02-14 PROCEDURE — 99214 OFFICE O/P EST MOD 30 MIN: CPT

## 2025-02-17 LAB — BACTERIA UR CULT: NORMAL

## 2025-03-10 NOTE — H&P PST ADULT - NSICDXPASTMEDICALHX_GEN_ALL_CORE_FT
Health Maintenance       Annual Physical (ages 3 - 21) (Yearly)  Overdue since 4/26/2024    Influenza Vaccine (1)  Order placed this encounter    COVID-19 Vaccine (1 - Pediatric 2024-25 season)  Never done           Following review of the above:  Declines vaccines    Note: Refer to final orders and clinician documentation.         PAST MEDICAL HISTORY:  H/O: depression     Hypertension

## 2025-08-06 ENCOUNTER — TRANSCRIPTION ENCOUNTER (OUTPATIENT)
Age: 45
End: 2025-08-06

## 2025-08-08 ENCOUNTER — TRANSCRIPTION ENCOUNTER (OUTPATIENT)
Age: 45
End: 2025-08-08

## 2025-09-05 ENCOUNTER — APPOINTMENT (OUTPATIENT)
Dept: UROLOGY | Facility: CLINIC | Age: 45
End: 2025-09-05
Payer: COMMERCIAL

## 2025-09-05 DIAGNOSIS — A60.02 HERPESVIRAL INFECTION OF OTHER MALE GENITAL ORGANS: ICD-10-CM

## 2025-09-05 DIAGNOSIS — N48.1 BALANITIS: ICD-10-CM

## 2025-09-05 DIAGNOSIS — N20.0 CALCULUS OF KIDNEY: ICD-10-CM

## 2025-09-05 DIAGNOSIS — F52.4 PREMATURE EJACULATION: ICD-10-CM

## 2025-09-05 DIAGNOSIS — N52.9 MALE ERECTILE DYSFUNCTION, UNSPECIFIED: ICD-10-CM

## 2025-09-05 PROCEDURE — 99214 OFFICE O/P EST MOD 30 MIN: CPT | Mod: 25

## 2025-09-05 PROCEDURE — 76775 US EXAM ABDO BACK WALL LIM: CPT

## (undated) DEVICE — SOL IRR BAG H2O 3000ML

## (undated) DEVICE — PACK CYSTO

## (undated) DEVICE — FOLEY TRAY 16FR 5CC LTX UMETER CLOSED

## (undated) DEVICE — BOSTON SCIENTIFC ENCORE 26 INFLATOR

## (undated) DEVICE — TUBING RANGER FLUID IRRIGATION SET DISP

## (undated) DEVICE — SOL IRR POUR NS 0.9% 500ML

## (undated) DEVICE — POSITIONER HEAD REST PRONE

## (undated) DEVICE — GLV 7.5 PROTEXIS (WHITE)

## (undated) DEVICE — VENODYNE/SCD SLEEVE CALF LARGE

## (undated) DEVICE — GOWN TRIMAX LG

## (undated) DEVICE — FOLEY HOLDER STATLOCK 2 WAY ADULT

## (undated) DEVICE — POSITIONER FOAM EGG CRATE ULNAR 2PCS (PINK)

## (undated) DEVICE — DRAPE C ARM UNIVERSAL

## (undated) DEVICE — GLV 7 PROTEXIS (WHITE)

## (undated) DEVICE — GLV 8 PROTEXIS (WHITE)

## (undated) DEVICE — ACMI SELF-SEALING SEAL UP TO 7FR

## (undated) DEVICE — TUBING TUR 2 PRONG

## (undated) DEVICE — WARMING BLANKET UPPER ADULT

## (undated) DEVICE — SYR ASEPTO

## (undated) DEVICE — VENODYNE/SCD SLEEVE CALF MEDIUM

## (undated) DEVICE — DRAPE EQUIPMENT BANDED BAG 30 X 30" (SHOWER CAP)

## (undated) DEVICE — SOL IRR POUR H2O 1500ML

## (undated) DEVICE — GLV 6.5 PROTEXIS (WHITE)

## (undated) DEVICE — SOL IRR BAG NS 0.9% 3000ML

## (undated) DEVICE — IRR BULB PATHFINDER + 10"

## (undated) DEVICE — COVER CAP 27" DEPTH